# Patient Record
Sex: FEMALE | Race: WHITE | NOT HISPANIC OR LATINO | Employment: UNEMPLOYED | URBAN - NONMETROPOLITAN AREA
[De-identification: names, ages, dates, MRNs, and addresses within clinical notes are randomized per-mention and may not be internally consistent; named-entity substitution may affect disease eponyms.]

---

## 2018-05-24 ENCOUNTER — HOSPITAL ENCOUNTER (EMERGENCY)
Facility: HOSPITAL | Age: 33
Discharge: HOME | End: 2018-05-24
Admitting: EMERGENCY MEDICINE

## 2018-05-24 VITALS
OXYGEN SATURATION: 98 % | RESPIRATION RATE: 18 BRPM | TEMPERATURE: 97.5 F | BODY MASS INDEX: 28.66 KG/M2 | DIASTOLIC BLOOD PRESSURE: 69 MMHG | SYSTOLIC BLOOD PRESSURE: 104 MMHG | HEART RATE: 69 BPM | HEIGHT: 60 IN | WEIGHT: 146 LBS

## 2018-05-24 DIAGNOSIS — J40 BRONCHITIS: Primary | ICD-10-CM

## 2018-05-24 DIAGNOSIS — J02.8 PHARYNGITIS DUE TO OTHER ORGANISM: ICD-10-CM

## 2018-05-24 PROCEDURE — 99282 EMERGENCY DEPT VISIT SF MDM: CPT

## 2018-05-24 RX ORDER — BENZONATATE 200 MG/1
200 CAPSULE ORAL 3 TIMES DAILY PRN
Qty: 15 CAPSULE | Refills: 0 | Status: SHIPPED | OUTPATIENT
Start: 2018-05-24 | End: 2018-10-31

## 2018-05-24 RX ORDER — CETIRIZINE HYDROCHLORIDE, PSEUDOEPHEDRINE HYDROCHLORIDE 5; 120 MG/1; MG/1
1 TABLET, FILM COATED, EXTENDED RELEASE ORAL 2 TIMES DAILY
Qty: 15 TABLET | Refills: 0 | Status: SHIPPED | OUTPATIENT
Start: 2018-05-24 | End: 2018-12-03

## 2018-05-24 RX ORDER — AZITHROMYCIN 250 MG/1
TABLET, FILM COATED ORAL
Qty: 6 TABLET | Refills: 0 | OUTPATIENT
Start: 2018-05-24 | End: 2018-12-18

## 2018-05-24 RX ORDER — FLUOXETINE HYDROCHLORIDE 20 MG/1
20 CAPSULE ORAL
COMMUNITY

## 2018-05-24 RX ORDER — OLANZAPINE 10 MG/1
5 TABLET ORAL NIGHTLY
COMMUNITY
End: 2018-12-18

## 2018-10-31 ENCOUNTER — APPOINTMENT (OUTPATIENT)
Dept: GENERAL RADIOLOGY | Facility: HOSPITAL | Age: 33
End: 2018-10-31

## 2018-10-31 ENCOUNTER — HOSPITAL ENCOUNTER (EMERGENCY)
Facility: HOSPITAL | Age: 33
Discharge: HOME | End: 2018-10-31
Admitting: EMERGENCY MEDICINE

## 2018-10-31 VITALS
SYSTOLIC BLOOD PRESSURE: 120 MMHG | OXYGEN SATURATION: 100 % | HEART RATE: 68 BPM | RESPIRATION RATE: 18 BRPM | BODY MASS INDEX: 31.61 KG/M2 | WEIGHT: 161 LBS | TEMPERATURE: 98.2 F | DIASTOLIC BLOOD PRESSURE: 65 MMHG | HEIGHT: 60 IN

## 2018-10-31 DIAGNOSIS — J06.9 ACUTE URI: Primary | ICD-10-CM

## 2018-10-31 LAB
FLUAV AG NPH QL: NEGATIVE
FLUBV AG NPH QL IA: NEGATIVE

## 2018-10-31 PROCEDURE — 87804 INFLUENZA ASSAY W/OPTIC: CPT | Performed by: PHYSICIAN ASSISTANT

## 2018-10-31 PROCEDURE — 71046 X-RAY EXAM CHEST 2 VIEWS: CPT

## 2018-10-31 PROCEDURE — 99284 EMERGENCY DEPT VISIT MOD MDM: CPT

## 2018-10-31 RX ORDER — CETIRIZINE HYDROCHLORIDE 10 MG/1
10 TABLET ORAL DAILY
Qty: 15 TABLET | Refills: 0 | Status: SHIPPED | OUTPATIENT
Start: 2018-10-31 | End: 2018-12-03

## 2018-10-31 RX ORDER — IBUPROFEN 800 MG/1
800 TABLET ORAL ONCE
Status: COMPLETED | OUTPATIENT
Start: 2018-10-31 | End: 2018-10-31

## 2018-10-31 RX ORDER — FLUTICASONE PROPIONATE 50 MCG
2 SPRAY, SUSPENSION (ML) NASAL DAILY
Qty: 1 BOTTLE | Refills: 0 | Status: SHIPPED | OUTPATIENT
Start: 2018-10-31 | End: 2018-12-03

## 2018-10-31 RX ORDER — BUSPIRONE HYDROCHLORIDE 5 MG/1
5 TABLET ORAL 3 TIMES DAILY
COMMUNITY
End: 2020-05-27

## 2018-10-31 RX ORDER — BENZONATATE 200 MG/1
200 CAPSULE ORAL 3 TIMES DAILY PRN
Qty: 15 CAPSULE | Refills: 0 | Status: SHIPPED | OUTPATIENT
Start: 2018-10-31 | End: 2018-12-03

## 2018-10-31 RX ADMIN — IBUPROFEN 800 MG: 800 TABLET ORAL at 11:02

## 2018-12-03 ENCOUNTER — OFFICE VISIT (OUTPATIENT)
Dept: GASTROENTEROLOGY | Facility: CLINIC | Age: 33
End: 2018-12-03

## 2018-12-03 ENCOUNTER — LAB (OUTPATIENT)
Dept: LAB | Facility: HOSPITAL | Age: 33
End: 2018-12-03

## 2018-12-03 VITALS
DIASTOLIC BLOOD PRESSURE: 72 MMHG | BODY MASS INDEX: 33.57 KG/M2 | SYSTOLIC BLOOD PRESSURE: 106 MMHG | WEIGHT: 171 LBS | TEMPERATURE: 97.6 F | HEART RATE: 78 BPM | HEIGHT: 60 IN | OXYGEN SATURATION: 99 %

## 2018-12-03 DIAGNOSIS — B18.2 CHRONIC HEPATITIS C WITHOUT HEPATIC COMA (HCC): Primary | ICD-10-CM

## 2018-12-03 DIAGNOSIS — Z72.0 TOBACCO USE: ICD-10-CM

## 2018-12-03 DIAGNOSIS — B18.2 CHRONIC HEPATITIS C WITHOUT HEPATIC COMA (HCC): ICD-10-CM

## 2018-12-03 PROCEDURE — 87522 HEPATITIS C REVRS TRNSCRPJ: CPT | Performed by: NURSE PRACTITIONER

## 2018-12-03 PROCEDURE — 87902 NFCT AGT GNTYP ALYS HEP C: CPT

## 2018-12-03 PROCEDURE — 99204 OFFICE O/P NEW MOD 45 MIN: CPT | Performed by: NURSE PRACTITIONER

## 2018-12-03 PROCEDURE — 36415 COLL VENOUS BLD VENIPUNCTURE: CPT

## 2018-12-03 RX ORDER — TOPIRAMATE 100 MG/1
100 TABLET, FILM COATED ORAL NIGHTLY
COMMUNITY
End: 2020-05-27

## 2018-12-03 NOTE — PROGRESS NOTES
[facility] GASTROENTEROLOGY - OFFICE NOTE    12/3/2018    Ema Stafford   1985    Primary Physician: Miles Pinzon APRN    Chief Complaint   Patient presents with   • Hepatitis C         HISTORY OF PRESENT ILLNESS    Ema Stafford is a 33 y.o. female presents  with chronic hepatitis c > 6 yrs ago. Has h/o iv drug use. Does have tattoos. No h/o blood transfusion in past.  She was seen by a GI md in [facility] around 4 yr ago and was to get further lab test. She did not follow up /never had the labs done. No h/o treatment of hep c. No h/o liver biopsy. Some nausea. No vomiting. Some fatigue.  No jaundice. No fever. No weight loss. No abdominal pain. Unsure if has ever had hep a or b vaccines.      She is interested in pursuing treatment for hep c.     Past Medical History:   Diagnosis Date   • Anxiety    • Depression    • Hepatitis C        Past Surgical History:   Procedure Laterality Date   •  SECTION      x 3   • FOREARM FRACTURE SURGERY Right    • TUBAL ABDOMINAL LIGATION         Outpatient Medications Marked as Taking for the 12/3/18 encounter (Office Visit) with Mckayla Zarco APRN   Medication Sig Dispense Refill   • azithromycin (ZITHROMAX Z-SHARI) 250 MG tablet Take 2 tablets the first day, then 1 tablet daily for 4 days. 6 tablet 0   • busPIRone (BUSPAR) 5 MG tablet Take 5 mg by mouth 3 (Three) Times a Day.     • FLUoxetine (PROZAC) 20 MG capsule Take 20 mg by mouth Daily.     • NON FORMULARY 1 tablet Every Night. Sleeping med     • OLANZapine (zyPREXA) 10 MG tablet Take 5 mg by mouth Every Night.     • topiramate (TOPAMAX) 100 MG tablet Take 100 mg by mouth 2 (Two) Times a Day.     • [DISCONTINUED] benzonatate (TESSALON) 200 MG capsule Take 1 capsule by mouth 3 (Three) Times a Day As Needed for Cough. 15 capsule 0       No Known Allergies    Social History     Socioeconomic History   • Marital status:      Spouse name: Not on file   • Number of children: Not on  "file   • Years of education: Not on file   • Highest education level: Not on file   Social Needs   • Financial resource strain: Not on file   • Food insecurity - worry: Not on file   • Food insecurity - inability: Not on file   • Transportation needs - medical: Not on file   • Transportation needs - non-medical: Not on file   Occupational History   • Not on file   Tobacco Use   • Smoking status: Current Some Day Smoker     Packs/day: 0.50     Types: Electronic Cigarette, Cigarettes   • Smokeless tobacco: Never Used   Substance and Sexual Activity   • Alcohol use: No   • Drug use: No   • Sexual activity: Defer   Other Topics Concern   • Not on file   Social History Narrative   • Not on file       Family History   Problem Relation Age of Onset   • Colon cancer Neg Hx    • Colon polyps Neg Hx        Review of Systems   Constitutional: Positive for fatigue. Negative for appetite change, chills, fever and unexpected weight change.   HENT: Negative for sore throat and trouble swallowing.    Eyes: Negative for visual disturbance.   Respiratory: Negative for cough, chest tightness, shortness of breath and wheezing.    Cardiovascular: Negative for chest pain and palpitations.   Gastrointestinal: Positive for nausea. Negative for abdominal distention, abdominal pain, anal bleeding, blood in stool, constipation, diarrhea, rectal pain and vomiting.        As mentioned in hpi   Genitourinary: Negative for difficulty urinating and hematuria.   Musculoskeletal: Negative for arthralgias and back pain.   Skin: Negative for color change and rash.   Neurological: Negative for dizziness, seizures, syncope, light-headedness and headaches.   Hematological: Negative for adenopathy.   Psychiatric/Behavioral: Negative for confusion. The patient is not nervous/anxious.         Vitals:    12/03/18 1020   BP: 106/72   Pulse: 78   Temp: 97.6 °F (36.4 °C)   SpO2: 99%   Weight: 77.6 kg (171 lb)   Height: 152.4 cm (60\")      Body mass index is " 33.4 kg/m².    Physical Exam   Constitutional: She appears well-developed and well-nourished. No distress.   HENT:   Head: Normocephalic and atraumatic.   Eyes: EOM are normal. No scleral icterus.   Neck: Neck supple. No JVD present.   Cardiovascular: Normal rate, regular rhythm and normal heart sounds.   Pulmonary/Chest: Effort normal and breath sounds normal. No stridor.   Abdominal: Soft. Bowel sounds are normal. She exhibits no distension and no mass. There is no tenderness. There is no rebound and no guarding.   Musculoskeletal: Normal range of motion. She exhibits no deformity.   Neurological: She is alert.   Skin: Skin is warm and dry. No rash noted.   Psychiatric: She has a normal mood and affect. Her behavior is normal.   Vitals reviewed.      Results for orders placed or performed during the hospital encounter of 10/31/18   Influenza Antigen, Rapid - Swab, Nasopharynx   Result Value Ref Range    Influenza A Ag, EIA Negative Negative    Influenza B Ag, EIA Negative Negative           ASSESSMENT AND PLAN    Assessment/Plan     Ema was seen today for hepatitis c.    Diagnoses and all orders for this visit:    Chronic hepatitis C without hepatic coma (CMS/HCC)  -     HCV RNA By PCR, Qn Rfx Merly; Future    Tobacco use    will check hcv rna and if detected then order additional lab test and ultrasound liver ( elastography). Ov 6 weeks. Will contact her with results of hcv rna quant.       I had a long discussion about hepatitis c.  We discussed the risks assoc such as hepatoma, cirrhosis and what that meant.  Discussed risk or premature death.  Discussed the ill effects of etoh consumption with hep c and I advised importance of abstinence. Discussed tx options and estimated cure rates from 95-99%. We discussed the cost of treatment and what insurance companies may pay for and not.  The significance of existing fibrosis was discussed.  We discussed how to take and importance of compliance with meds, labs,  and follow up visits as directed.         I advised Ema of the risks of continuing to use tobacco, and I provided her with tobacco cessation educational materials in the After Visit Summary.     During this visit, I spent > 3  minutes counseling the patient regarding tobacco cessation.             Body mass index is 33.4 kg/m².    Patient's Body mass index is 33.4 kg/m². BMI is above normal parameters. Recommendations include: no follow-up required. Recommend weight loss.            WARREN Montelongo    EMR Dragon/transcription disclaimer:  Much of this encounter note is electronic transcription/translation of spoken language to printed text.  The electronic translation of spoken language may be erroneous, or at times, nonsensical words or phrases may be inadvertently transcribed.  Although I have reviewed the note for such errors, some may still exist.    Results for orders placed or performed during the hospital encounter of 10/31/18   Influenza Antigen, Rapid - Swab, Nasopharynx   Result Value Ref Range    Influenza A Ag, EIA Negative Negative    Influenza B Ag, EIA Negative Negative

## 2018-12-06 LAB
HCV GENTYP SERPL NAA+PROBE: NORMAL
HCV GENTYP SERPL NAA+PROBE: NORMAL
HCV RNA SERPL NAA+PROBE-ACNC: NORMAL IU/ML
HCV RNA SERPL NAA+PROBE-LOG IU: 6.03 LOG10 IU/ML
Lab: NORMAL
REF LAB TEST REF RANGE: NORMAL

## 2018-12-10 ENCOUNTER — TELEPHONE (OUTPATIENT)
Dept: GASTROENTEROLOGY | Facility: CLINIC | Age: 33
End: 2018-12-10

## 2018-12-10 DIAGNOSIS — B18.2 CHRONIC HEPATITIS C WITHOUT HEPATIC COMA (HCC): Primary | ICD-10-CM

## 2018-12-10 NOTE — TELEPHONE ENCOUNTER
Please let her know hepatitis c is still detected. We need to get additional lab test and ultrasound elastography at this point before next ov. I will put orders in computer. She has f/u appt 1-28-19.

## 2018-12-17 ENCOUNTER — HOSPITAL ENCOUNTER (OUTPATIENT)
Dept: ULTRASOUND IMAGING | Facility: HOSPITAL | Age: 33
Discharge: HOME | End: 2018-12-17
Admitting: NURSE PRACTITIONER

## 2018-12-17 ENCOUNTER — TELEPHONE (OUTPATIENT)
Dept: GASTROENTEROLOGY | Facility: CLINIC | Age: 33
End: 2018-12-17

## 2018-12-17 ENCOUNTER — LAB (OUTPATIENT)
Dept: LAB | Facility: HOSPITAL | Age: 33
End: 2018-12-17

## 2018-12-17 DIAGNOSIS — B18.2 CHRONIC HEPATITIS C WITHOUT HEPATIC COMA (HCC): ICD-10-CM

## 2018-12-17 DIAGNOSIS — R76.8 HEPATITIS C ANTIBODY POSITIVE IN BLOOD: ICD-10-CM

## 2018-12-17 LAB
ALBUMIN SERPL-MCNC: 3.7 G/DL (ref 3.5–5)
ALBUMIN/GLOB SERPL: 1.2 G/DL (ref 1.1–2.5)
ALP SERPL-CCNC: 122 U/L (ref 24–120)
ALT SERPL W P-5'-P-CCNC: 72 U/L (ref 0–54)
ANION GAP SERPL CALCULATED.3IONS-SCNC: 12 MMOL/L (ref 4–13)
AST SERPL-CCNC: 79 U/L (ref 7–45)
BASOPHILS # BLD MANUAL: 0.1 10*3/MM3 (ref 0–0.2)
BASOPHILS NFR BLD AUTO: 2.1 % (ref 0–2)
BILIRUB SERPL-MCNC: 0.3 MG/DL (ref 0.1–1)
BUN BLD-MCNC: 8 MG/DL (ref 5–21)
BUN/CREAT SERPL: 13.6 (ref 7–25)
CALCIUM SPEC-SCNC: 8.5 MG/DL (ref 8.4–10.4)
CHLORIDE SERPL-SCNC: 105 MMOL/L (ref 98–110)
CO2 SERPL-SCNC: 23 MMOL/L (ref 24–31)
CREAT BLD-MCNC: 0.59 MG/DL (ref 0.5–1.4)
DEPRECATED RDW RBC AUTO: 44 FL (ref 40–54)
EOSINOPHIL # BLD MANUAL: 0.05 10*3/MM3 (ref 0–0.7)
EOSINOPHIL NFR BLD MANUAL: 1 % (ref 0–4)
ERYTHROCYTE [DISTWIDTH] IN BLOOD BY AUTOMATED COUNT: 13.5 % (ref 12–15)
ETHANOL UR QL: <0.01 %
GFR SERPL CREATININE-BSD FRML MDRD: 116 ML/MIN/1.73
GLOBULIN UR ELPH-MCNC: 3.2 GM/DL
GLUCOSE BLD-MCNC: 111 MG/DL (ref 70–100)
HBV CORE IGM SERPL QL IA: NEGATIVE
HBV SURFACE AB SER QL: <5
HBV SURFACE AB SER RIA-ACNC: ABNORMAL
HBV SURFACE AG SERPL QL IA: NEGATIVE
HCT VFR BLD AUTO: 35.5 % (ref 37–47)
HGB BLD-MCNC: 11.6 G/DL (ref 12–16)
HIV1+2 AB SER QL: NEGATIVE
INR PPP: 0.94 (ref 0.91–1.09)
LYMPHOCYTES # BLD MANUAL: 1.48 10*3/MM3 (ref 0.72–4.86)
LYMPHOCYTES NFR BLD MANUAL: 31.3 % (ref 15–45)
LYMPHOCYTES NFR BLD MANUAL: 7.3 % (ref 4–12)
MCH RBC QN AUTO: 28.6 PG (ref 28–32)
MCHC RBC AUTO-ENTMCNC: 32.7 G/DL (ref 33–36)
MCV RBC AUTO: 87.7 FL (ref 82–98)
MONOCYTES # BLD AUTO: 0.35 10*3/MM3 (ref 0.19–1.3)
NEUTROPHILS # BLD AUTO: 2.61 10*3/MM3 (ref 1.87–8.4)
NEUTROPHILS NFR BLD MANUAL: 55.2 % (ref 39–78)
PLAT MORPH BLD: NORMAL
PLATELET # BLD AUTO: 231 10*3/MM3 (ref 130–400)
PMV BLD AUTO: 9.4 FL (ref 6–12)
POTASSIUM BLD-SCNC: 3.7 MMOL/L (ref 3.5–5.3)
PROT SERPL-MCNC: 6.9 G/DL (ref 6.3–8.7)
PROTHROMBIN TIME: 12.8 SECONDS (ref 11.9–14.6)
RBC # BLD AUTO: 4.05 10*6/MM3 (ref 4.2–5.4)
RBC MORPH BLD: NORMAL
SODIUM BLD-SCNC: 140 MMOL/L (ref 135–145)
VARIANT LYMPHS NFR BLD MANUAL: 3.1 % (ref 0–5)
WBC MORPH BLD: NORMAL
WBC NRBC COR # BLD: 4.73 10*3/MM3 (ref 4.8–10.8)

## 2018-12-17 PROCEDURE — 85007 BL SMEAR W/DIFF WBC COUNT: CPT | Performed by: NURSE PRACTITIONER

## 2018-12-17 PROCEDURE — 80307 DRUG TEST PRSMV CHEM ANLYZR: CPT | Performed by: NURSE PRACTITIONER

## 2018-12-17 PROCEDURE — 36415 COLL VENOUS BLD VENIPUNCTURE: CPT

## 2018-12-17 PROCEDURE — G0480 DRUG TEST DEF 1-7 CLASSES: HCPCS

## 2018-12-17 PROCEDURE — 76705 ECHO EXAM OF ABDOMEN: CPT

## 2018-12-17 PROCEDURE — 80053 COMPREHEN METABOLIC PANEL: CPT | Performed by: NURSE PRACTITIONER

## 2018-12-17 PROCEDURE — 86705 HEP B CORE ANTIBODY IGM: CPT | Performed by: NURSE PRACTITIONER

## 2018-12-17 PROCEDURE — 86708 HEPATITIS A ANTIBODY: CPT | Performed by: NURSE PRACTITIONER

## 2018-12-17 PROCEDURE — 86709 HEPATITIS A IGM ANTIBODY: CPT | Performed by: NURSE PRACTITIONER

## 2018-12-17 PROCEDURE — 85025 COMPLETE CBC W/AUTO DIFF WBC: CPT | Performed by: NURSE PRACTITIONER

## 2018-12-17 PROCEDURE — 85610 PROTHROMBIN TIME: CPT | Performed by: NURSE PRACTITIONER

## 2018-12-17 PROCEDURE — 86706 HEP B SURFACE ANTIBODY: CPT | Performed by: NURSE PRACTITIONER

## 2018-12-17 PROCEDURE — G0432 EIA HIV-1/HIV-2 SCREEN: HCPCS | Performed by: NURSE PRACTITIONER

## 2018-12-17 PROCEDURE — 87340 HEPATITIS B SURFACE AG IA: CPT | Performed by: NURSE PRACTITIONER

## 2018-12-17 NOTE — TELEPHONE ENCOUNTER
Please let her know ultrasound liver elastography showed changes suggestive of fatty liver with little to no scarring. No cirrhosis noted. Recommend keep f/u appt 1-28-19 to discuss and f/u on lab test. Thank you . I faxed labs and ultrasound to her pcp.

## 2018-12-18 ENCOUNTER — TELEPHONE (OUTPATIENT)
Dept: GASTROENTEROLOGY | Facility: CLINIC | Age: 33
End: 2018-12-18

## 2018-12-18 DIAGNOSIS — R76.8 HEPATITIS C ANTIBODY POSITIVE IN BLOOD: Primary | ICD-10-CM

## 2018-12-18 LAB
HAV AB SER QL IA: POSITIVE
HAV IGM SERPL QL IA: NEGATIVE

## 2018-12-18 NOTE — TELEPHONE ENCOUNTER
Can you call the lab and see  if they can run a hepatitis IgM on this blood work . I will go ahead and put in the order.thanks

## 2018-12-20 ENCOUNTER — TELEPHONE (OUTPATIENT)
Dept: GASTROENTEROLOGY | Facility: CLINIC | Age: 33
End: 2018-12-20

## 2018-12-26 LAB
6MAM SERPLBLD-MCNC: NEGATIVE NG/ML
AMPHETAMINES SERPL QL SCN: NEGATIVE NG/ML
BARBITURATES SERPLBLD QL: NEGATIVE UG/ML
BENZODIAZ SERPL QL: NEGATIVE NG/ML
BZE BLD QL SCN: NEGATIVE NG/ML
CODEINE UR QL: NEGATIVE NG/ML
DHC BLD-MCNC: 2.1 NG/ML
HYDROCODONE SERPL-MCNC: 6.1 NG/ML
HYDROMORPHONE SERPL-MCNC: NEGATIVE NG/ML
METHADONE UR QL: NEGATIVE NG/ML
MORPHINE SERPLBLD-MCNC: NEGATIVE NG/ML
OPIATES SERPL QL SCN: ABNORMAL NG/ML
OPIATES SPEC QL: POSITIVE
OXYCODONE SERPL-MCNC: NEGATIVE NG/ML
OXYCODONE SERPLBLD CFM-MCNC: NEGATIVE NG/ML
OXYCODONE+OXYMORPHONE SERPLBLD CFM-IMP: NEGATIVE
OXYMORPHONE SERPLBLD CFM-MCNC: NEGATIVE NG/ML
PCP SPEC-MCNC: NEGATIVE NG/ML
PROPOXYPH SPEC QL: NEGATIVE NG/ML
THC SERPLBLD CFM-MCNC: NEGATIVE NG/ML

## 2019-01-28 PROBLEM — B19.20 HEPATITIS C: Status: ACTIVE | Noted: 2019-01-28

## 2019-05-06 ENCOUNTER — APPOINTMENT (OUTPATIENT)
Dept: LAB | Facility: HOSPITAL | Age: 34
End: 2019-05-06

## 2019-05-06 ENCOUNTER — TRANSCRIBE ORDERS (OUTPATIENT)
Dept: ADMINISTRATIVE | Facility: HOSPITAL | Age: 34
End: 2019-05-06

## 2019-05-06 DIAGNOSIS — Z79.899 ENCOUNTER FOR LONG-TERM (CURRENT) USE OF MEDICATIONS: Primary | ICD-10-CM

## 2019-05-06 LAB
ALBUMIN SERPL-MCNC: 4.3 G/DL (ref 3.5–5)
ALBUMIN/GLOB SERPL: 1.3 G/DL (ref 1.1–2.5)
ALP SERPL-CCNC: 96 U/L (ref 24–120)
ALT SERPL W P-5'-P-CCNC: 48 U/L (ref 0–54)
ANION GAP SERPL CALCULATED.3IONS-SCNC: 9 MMOL/L (ref 4–13)
AST SERPL-CCNC: 49 U/L (ref 7–45)
BILIRUB SERPL-MCNC: 0.3 MG/DL (ref 0.1–1)
BUN BLD-MCNC: 8 MG/DL (ref 5–21)
BUN/CREAT SERPL: 13.6 (ref 7–25)
CALCIUM SPEC-SCNC: 9.3 MG/DL (ref 8.4–10.4)
CHLORIDE SERPL-SCNC: 108 MMOL/L (ref 98–110)
CO2 SERPL-SCNC: 22 MMOL/L (ref 24–31)
CREAT BLD-MCNC: 0.59 MG/DL (ref 0.5–1.4)
DEPRECATED RDW RBC AUTO: 41.1 FL (ref 40–54)
ERYTHROCYTE [DISTWIDTH] IN BLOOD BY AUTOMATED COUNT: 13.2 % (ref 12–15)
GFR SERPL CREATININE-BSD FRML MDRD: 116 ML/MIN/1.73
GLOBULIN UR ELPH-MCNC: 3.4 GM/DL
GLUCOSE BLD-MCNC: 96 MG/DL (ref 70–100)
HCT VFR BLD AUTO: 39.5 % (ref 37–47)
HGB BLD-MCNC: 13 G/DL (ref 12–16)
MCH RBC QN AUTO: 28.4 PG (ref 28–32)
MCHC RBC AUTO-ENTMCNC: 32.9 G/DL (ref 33–36)
MCV RBC AUTO: 86.4 FL (ref 82–98)
PLATELET # BLD AUTO: 245 10*3/MM3 (ref 130–400)
PMV BLD AUTO: 9.9 FL (ref 6–12)
POTASSIUM BLD-SCNC: 4 MMOL/L (ref 3.5–5.3)
PROT SERPL-MCNC: 7.7 G/DL (ref 6.3–8.7)
RBC # BLD AUTO: 4.57 10*6/MM3 (ref 4.2–5.4)
SODIUM BLD-SCNC: 139 MMOL/L (ref 135–145)
VALPROATE SERPL-MCNC: <10 MCG/ML (ref 50–100)
WBC NRBC COR # BLD: 8.26 10*3/MM3 (ref 4.8–10.8)

## 2019-05-06 PROCEDURE — 85027 COMPLETE CBC AUTOMATED: CPT | Performed by: PSYCHIATRY & NEUROLOGY

## 2019-05-06 PROCEDURE — 36415 COLL VENOUS BLD VENIPUNCTURE: CPT | Performed by: PSYCHIATRY & NEUROLOGY

## 2019-05-06 PROCEDURE — 80053 COMPREHEN METABOLIC PANEL: CPT | Performed by: PSYCHIATRY & NEUROLOGY

## 2019-05-06 PROCEDURE — 80164 ASSAY DIPROPYLACETIC ACD TOT: CPT | Performed by: PSYCHIATRY & NEUROLOGY

## 2019-06-06 ENCOUNTER — APPOINTMENT (OUTPATIENT)
Dept: CT IMAGING | Facility: HOSPITAL | Age: 34
End: 2019-06-06

## 2019-06-06 ENCOUNTER — HOSPITAL ENCOUNTER (EMERGENCY)
Facility: HOSPITAL | Age: 34
Discharge: HOME | End: 2019-06-06
Admitting: EMERGENCY MEDICINE

## 2019-06-06 VITALS
OXYGEN SATURATION: 99 % | RESPIRATION RATE: 18 BRPM | BODY MASS INDEX: 28.74 KG/M2 | HEART RATE: 64 BPM | DIASTOLIC BLOOD PRESSURE: 73 MMHG | HEIGHT: 63 IN | SYSTOLIC BLOOD PRESSURE: 105 MMHG | WEIGHT: 162.2 LBS | TEMPERATURE: 98.2 F

## 2019-06-06 DIAGNOSIS — R10.9 ACUTE LEFT FLANK PAIN: Primary | ICD-10-CM

## 2019-06-06 LAB
ALBUMIN SERPL-MCNC: 3.9 G/DL (ref 3.5–5)
ALBUMIN/GLOB SERPL: 1.1 G/DL (ref 1.1–2.5)
ALP SERPL-CCNC: 92 U/L (ref 24–120)
ALT SERPL W P-5'-P-CCNC: 40 U/L (ref 0–54)
ANION GAP SERPL CALCULATED.3IONS-SCNC: 7 MMOL/L (ref 4–13)
AST SERPL-CCNC: 39 U/L (ref 7–45)
B-HCG UR QL: NEGATIVE
BACTERIA UR QL AUTO: ABNORMAL /HPF
BASOPHILS # BLD AUTO: 0.04 10*3/MM3 (ref 0–0.2)
BASOPHILS NFR BLD AUTO: 0.5 % (ref 0–2)
BILIRUB SERPL-MCNC: 0.4 MG/DL (ref 0.1–1)
BILIRUB UR QL STRIP: NEGATIVE
BUN BLD-MCNC: 13 MG/DL (ref 5–21)
BUN/CREAT SERPL: 20.6 (ref 7–25)
CALCIUM SPEC-SCNC: 8.6 MG/DL (ref 8.4–10.4)
CHLORIDE SERPL-SCNC: 111 MMOL/L (ref 98–110)
CLARITY UR: ABNORMAL
CO2 SERPL-SCNC: 20 MMOL/L (ref 24–31)
COD CRY URNS QL: ABNORMAL /HPF
COLOR UR: YELLOW
CREAT BLD-MCNC: 0.63 MG/DL (ref 0.5–1.4)
DEPRECATED RDW RBC AUTO: 42.7 FL (ref 40–54)
EOSINOPHIL # BLD AUTO: 0.15 10*3/MM3 (ref 0–0.7)
EOSINOPHIL NFR BLD AUTO: 1.8 % (ref 0–4)
ERYTHROCYTE [DISTWIDTH] IN BLOOD BY AUTOMATED COUNT: 13.4 % (ref 12–15)
GFR SERPL CREATININE-BSD FRML MDRD: 108 ML/MIN/1.73
GLOBULIN UR ELPH-MCNC: 3.4 GM/DL
GLUCOSE BLD-MCNC: 78 MG/DL (ref 70–100)
GLUCOSE UR STRIP-MCNC: NEGATIVE MG/DL
HCT VFR BLD AUTO: 35.9 % (ref 37–47)
HGB BLD-MCNC: 12.3 G/DL (ref 12–16)
HGB UR QL STRIP.AUTO: NEGATIVE
IMM GRANULOCYTES # BLD AUTO: 0.02 10*3/MM3 (ref 0–0.05)
IMM GRANULOCYTES NFR BLD AUTO: 0.2 % (ref 0–5)
INTERNAL NEGATIVE CONTROL: NEGATIVE
INTERNAL POSITIVE CONTROL: POSITIVE
KETONES UR QL STRIP: NEGATIVE
LEUKOCYTE ESTERASE UR QL STRIP.AUTO: ABNORMAL
LIPASE SERPL-CCNC: 130 U/L (ref 23–203)
LYMPHOCYTES # BLD AUTO: 2.26 10*3/MM3 (ref 0.72–4.86)
LYMPHOCYTES NFR BLD AUTO: 26.6 % (ref 15–45)
Lab: NORMAL
MCH RBC QN AUTO: 29.7 PG (ref 28–32)
MCHC RBC AUTO-ENTMCNC: 34.3 G/DL (ref 33–36)
MCV RBC AUTO: 86.7 FL (ref 82–98)
MONOCYTES # BLD AUTO: 0.6 10*3/MM3 (ref 0.19–1.3)
MONOCYTES NFR BLD AUTO: 7.1 % (ref 4–12)
NEUTROPHILS # BLD AUTO: 5.43 10*3/MM3 (ref 1.87–8.4)
NEUTROPHILS NFR BLD AUTO: 63.8 % (ref 39–78)
NITRITE UR QL STRIP: NEGATIVE
NRBC BLD AUTO-RTO: 0 /100 WBC (ref 0–0.2)
PH UR STRIP.AUTO: <=5 [PH] (ref 5–8)
PLATELET # BLD AUTO: 244 10*3/MM3 (ref 130–400)
PMV BLD AUTO: 9.9 FL (ref 6–12)
POTASSIUM BLD-SCNC: 4.1 MMOL/L (ref 3.5–5.3)
PROT SERPL-MCNC: 7.3 G/DL (ref 6.3–8.7)
PROT UR QL STRIP: NEGATIVE
RBC # BLD AUTO: 4.14 10*6/MM3 (ref 4.2–5.4)
RBC # UR: ABNORMAL /HPF
REF LAB TEST METHOD: ABNORMAL
SODIUM BLD-SCNC: 138 MMOL/L (ref 135–145)
SP GR UR STRIP: 1.02 (ref 1–1.03)
SQUAMOUS #/AREA URNS HPF: ABNORMAL /HPF
UROBILINOGEN UR QL STRIP: ABNORMAL
WBC NRBC COR # BLD: 8.5 10*3/MM3 (ref 4.8–10.8)
WBC UR QL AUTO: ABNORMAL /HPF
YEAST URNS QL MICRO: ABNORMAL /HPF

## 2019-06-06 PROCEDURE — 81025 URINE PREGNANCY TEST: CPT | Performed by: NURSE PRACTITIONER

## 2019-06-06 PROCEDURE — 80053 COMPREHEN METABOLIC PANEL: CPT | Performed by: NURSE PRACTITIONER

## 2019-06-06 PROCEDURE — 74176 CT ABD & PELVIS W/O CONTRAST: CPT

## 2019-06-06 PROCEDURE — 81001 URINALYSIS AUTO W/SCOPE: CPT | Performed by: NURSE PRACTITIONER

## 2019-06-06 PROCEDURE — 83690 ASSAY OF LIPASE: CPT | Performed by: NURSE PRACTITIONER

## 2019-06-06 PROCEDURE — 25010000002 KETOROLAC TROMETHAMINE PER 15 MG: Performed by: NURSE PRACTITIONER

## 2019-06-06 PROCEDURE — 85025 COMPLETE CBC W/AUTO DIFF WBC: CPT | Performed by: NURSE PRACTITIONER

## 2019-06-06 PROCEDURE — 96374 THER/PROPH/DIAG INJ IV PUSH: CPT

## 2019-06-06 PROCEDURE — 96375 TX/PRO/DX INJ NEW DRUG ADDON: CPT

## 2019-06-06 PROCEDURE — 99284 EMERGENCY DEPT VISIT MOD MDM: CPT

## 2019-06-06 PROCEDURE — 25010000002 ONDANSETRON PER 1 MG: Performed by: NURSE PRACTITIONER

## 2019-06-06 RX ORDER — KETOROLAC TROMETHAMINE 30 MG/ML
30 INJECTION, SOLUTION INTRAMUSCULAR; INTRAVENOUS ONCE
Status: COMPLETED | OUTPATIENT
Start: 2019-06-06 | End: 2019-06-06

## 2019-06-06 RX ORDER — ONDANSETRON 4 MG/1
4 TABLET, ORALLY DISINTEGRATING ORAL EVERY 6 HOURS PRN
Qty: 10 TABLET | Refills: 0 | Status: SHIPPED | OUTPATIENT
Start: 2019-06-06 | End: 2020-02-24

## 2019-06-06 RX ORDER — ONDANSETRON 2 MG/ML
4 INJECTION INTRAMUSCULAR; INTRAVENOUS ONCE
Status: COMPLETED | OUTPATIENT
Start: 2019-06-06 | End: 2019-06-06

## 2019-06-06 RX ORDER — KETOROLAC TROMETHAMINE 10 MG/1
10 TABLET, FILM COATED ORAL EVERY 6 HOURS PRN
Qty: 15 TABLET | Refills: 0 | Status: SHIPPED | OUTPATIENT
Start: 2019-06-06 | End: 2020-02-24

## 2019-06-06 RX ADMIN — KETOROLAC TROMETHAMINE 30 MG: 30 INJECTION, SOLUTION INTRAMUSCULAR at 17:51

## 2019-06-06 RX ADMIN — ONDANSETRON 4 MG: 2 INJECTION INTRAMUSCULAR; INTRAVENOUS at 17:51

## 2019-06-06 RX ADMIN — SODIUM CHLORIDE 500 ML: 9 INJECTION, SOLUTION INTRAVENOUS at 17:51

## 2019-06-06 NOTE — ED PROVIDER NOTES
Subjective     Abdominal Pain   Pain location:  L flank  Pain quality: sharp    Pain severity:  Moderate  Duration:  2 weeks  Timing:  Intermittent  Chronicity:  New  Relieved by:  None tried  Worsened by:  Nothing  Ineffective treatments:  None tried  Associated symptoms: constipation, dysuria, nausea and vomiting    Associated symptoms: no chills, no fatigue, no fever, no shortness of breath and no sore throat        Review of Systems   Constitutional: Negative for appetite change, chills, fatigue and fever.   HENT: Negative for congestion and sore throat.    Respiratory: Negative for chest tightness and shortness of breath.    Cardiovascular: Negative for palpitations.   Gastrointestinal: Positive for abdominal pain, constipation, nausea and vomiting. Negative for abdominal distention.   Genitourinary: Positive for dysuria. Negative for difficulty urinating.   Musculoskeletal: Negative for back pain, joint swelling, neck pain and neck stiffness.   Skin: Negative for rash.   All other systems reviewed and are negative.      Past Medical History:   Diagnosis Date   • Anxiety    • Depression    • Hepatitis C        No Known Allergies    Past Surgical History:   Procedure Laterality Date   •  SECTION      x 3   • FOREARM FRACTURE SURGERY Right    • TUBAL ABDOMINAL LIGATION         Family History   Problem Relation Age of Onset   • Colon cancer Neg Hx    • Colon polyps Neg Hx        Social History     Socioeconomic History   • Marital status:      Spouse name: Not on file   • Number of children: Not on file   • Years of education: Not on file   • Highest education level: Not on file   Tobacco Use   • Smoking status: Current Some Day Smoker     Packs/day: 0.50     Types: Electronic Cigarette, Cigarettes   • Smokeless tobacco: Never Used   Substance and Sexual Activity   • Alcohol use: No   • Drug use: No   • Sexual activity: Defer           Objective   Physical Exam   Constitutional: She is oriented to  person, place, and time. She appears well-developed and well-nourished.   HENT:   Head: Normocephalic and atraumatic.   Nose: Nose normal.   Mouth/Throat: Oropharynx is clear and moist.   Eyes: Conjunctivae and EOM are normal. Pupils are equal, round, and reactive to light.   Neck: Normal range of motion. Neck supple.   Cardiovascular: Normal rate, regular rhythm, normal heart sounds and intact distal pulses.   Pulmonary/Chest: Effort normal and breath sounds normal.   Abdominal: Soft. Bowel sounds are normal. There is tenderness in the left lower quadrant. There is CVA tenderness.   Musculoskeletal: Normal range of motion.   Neurological: She is alert and oriented to person, place, and time.   Skin: Skin is warm and dry. Capillary refill takes less than 2 seconds.   Psychiatric: She has a normal mood and affect. Her behavior is normal.   Nursing note and vitals reviewed.      Procedures           ED Course                  MDM  Number of Diagnoses or Management Options  Acute left flank pain: new and requires workup     Amount and/or Complexity of Data Reviewed  Clinical lab tests: ordered and reviewed  Tests in the radiology section of CPT®: ordered and reviewed  Discuss the patient with other providers: yes    Risk of Complications, Morbidity, and/or Mortality  Presenting problems: moderate  Diagnostic procedures: moderate  Management options: moderate    Patient Progress  Patient progress: improved        Final diagnoses:   Acute left flank pain            Nicole Martinez, WARREN  06/06/19 0217

## 2019-06-24 ENCOUNTER — HOSPITAL ENCOUNTER (OUTPATIENT)
Dept: GENERAL RADIOLOGY | Facility: HOSPITAL | Age: 34
Discharge: HOME | End: 2019-06-24

## 2019-06-24 ENCOUNTER — TRANSCRIBE ORDERS (OUTPATIENT)
Dept: ADMINISTRATIVE | Facility: HOSPITAL | Age: 34
End: 2019-06-24

## 2019-06-24 DIAGNOSIS — M79.671 RIGHT FOOT PAIN: Primary | ICD-10-CM

## 2019-06-24 DIAGNOSIS — M79.671 RIGHT FOOT PAIN: ICD-10-CM

## 2019-06-24 PROCEDURE — 73630 X-RAY EXAM OF FOOT: CPT

## 2019-11-15 ENCOUNTER — HOSPITAL ENCOUNTER (EMERGENCY)
Facility: HOSPITAL | Age: 34
Discharge: HOME | End: 2019-11-15
Admitting: EMERGENCY MEDICINE

## 2019-11-15 VITALS
BODY MASS INDEX: 32.98 KG/M2 | OXYGEN SATURATION: 98 % | TEMPERATURE: 97.4 F | DIASTOLIC BLOOD PRESSURE: 77 MMHG | WEIGHT: 168 LBS | HEART RATE: 87 BPM | HEIGHT: 60 IN | SYSTOLIC BLOOD PRESSURE: 125 MMHG | RESPIRATION RATE: 15 BRPM

## 2019-11-15 DIAGNOSIS — R31.9 URINARY TRACT INFECTION WITH HEMATURIA, SITE UNSPECIFIED: Primary | ICD-10-CM

## 2019-11-15 DIAGNOSIS — N39.0 URINARY TRACT INFECTION WITH HEMATURIA, SITE UNSPECIFIED: Primary | ICD-10-CM

## 2019-11-15 LAB
B-HCG UR QL: NEGATIVE
BACTERIA UR QL AUTO: ABNORMAL /HPF
BILIRUB UR QL STRIP: NEGATIVE
CLARITY UR: CLEAR
COLOR UR: YELLOW
GLUCOSE UR STRIP-MCNC: NEGATIVE MG/DL
HGB UR QL STRIP.AUTO: ABNORMAL
HYALINE CASTS UR QL AUTO: ABNORMAL /LPF
INTERNAL NEGATIVE CONTROL: NEGATIVE
INTERNAL POSITIVE CONTROL: POSITIVE
KETONES UR QL STRIP: NEGATIVE
LEUKOCYTE ESTERASE UR QL STRIP.AUTO: ABNORMAL
Lab: NORMAL
NITRITE UR QL STRIP: NEGATIVE
PH UR STRIP.AUTO: 6.5 [PH] (ref 5–8)
PROT UR QL STRIP: NEGATIVE
RBC # UR: ABNORMAL /HPF
REF LAB TEST METHOD: ABNORMAL
SP GR UR STRIP: 1.02 (ref 1–1.03)
SQUAMOUS #/AREA URNS HPF: ABNORMAL /HPF
UROBILINOGEN UR QL STRIP: ABNORMAL
WBC UR QL AUTO: ABNORMAL /HPF

## 2019-11-15 PROCEDURE — 99283 EMERGENCY DEPT VISIT LOW MDM: CPT

## 2019-11-15 PROCEDURE — 87086 URINE CULTURE/COLONY COUNT: CPT | Performed by: NURSE PRACTITIONER

## 2019-11-15 PROCEDURE — 81025 URINE PREGNANCY TEST: CPT | Performed by: NURSE PRACTITIONER

## 2019-11-15 PROCEDURE — 81001 URINALYSIS AUTO W/SCOPE: CPT | Performed by: NURSE PRACTITIONER

## 2019-11-15 RX ORDER — SULFAMETHOXAZOLE AND TRIMETHOPRIM 800; 160 MG/1; MG/1
1 TABLET ORAL 2 TIMES DAILY
Qty: 20 TABLET | Refills: 0 | Status: SHIPPED | OUTPATIENT
Start: 2019-11-15 | End: 2019-11-25

## 2019-11-15 RX ORDER — PHENAZOPYRIDINE HYDROCHLORIDE 200 MG/1
200 TABLET, FILM COATED ORAL 3 TIMES DAILY PRN
Qty: 6 TABLET | Refills: 0 | Status: SHIPPED | OUTPATIENT
Start: 2019-11-15 | End: 2020-02-24

## 2019-11-16 LAB — BACTERIA SPEC AEROBE CULT: NORMAL

## 2019-11-16 NOTE — ED PROVIDER NOTES
Subjective     Dysuria   Pain quality:  Burning and sharp  Pain severity:  Moderate  Chronicity:  New  Recent urinary tract infections: no    Relieved by:  None tried  Worsened by:  Nothing  Ineffective treatments:  None tried  Urinary symptoms: frequent urination and hematuria    Urinary symptoms: no hesitancy and no bladder incontinence    Associated symptoms: abdominal pain    Associated symptoms: no fever, no flank pain, no genital lesions, no nausea, no vaginal discharge and no vomiting    Risk factors: no hx of pyelonephritis, no hx of urolithiasis and no recurrent urinary tract infections        Review of Systems   Constitutional: Negative.  Negative for fever.   HENT: Negative.    Respiratory: Negative.    Cardiovascular: Negative.    Gastrointestinal: Positive for abdominal pain. Negative for nausea and vomiting.   Genitourinary: Positive for dysuria and hematuria. Negative for flank pain and vaginal discharge.   Musculoskeletal: Negative.    Skin: Negative.    All other systems reviewed and are negative.      Past Medical History:   Diagnosis Date   • Anxiety    • Depression    • Hepatitis C        No Known Allergies    Past Surgical History:   Procedure Laterality Date   •  SECTION      x 3   • FOREARM FRACTURE SURGERY Right    • TUBAL ABDOMINAL LIGATION         Family History   Problem Relation Age of Onset   • Colon cancer Neg Hx    • Colon polyps Neg Hx        Social History     Socioeconomic History   • Marital status:      Spouse name: Not on file   • Number of children: Not on file   • Years of education: Not on file   • Highest education level: Not on file   Tobacco Use   • Smoking status: Current Some Day Smoker     Packs/day: 0.50     Types: Electronic Cigarette, Cigarettes   • Smokeless tobacco: Never Used   Substance and Sexual Activity   • Alcohol use: No   • Drug use: No   • Sexual activity: Defer           Objective   Physical Exam   Constitutional: She is oriented to person,  place, and time. She appears well-developed and well-nourished.   HENT:   Head: Normocephalic and atraumatic.   Right Ear: External ear normal.   Left Ear: External ear normal.   Nose: Nose normal.   Eyes: Conjunctivae and EOM are normal. Pupils are equal, round, and reactive to light.   Neck: Normal range of motion. Neck supple.   Cardiovascular: Normal rate, regular rhythm, normal heart sounds and intact distal pulses.   Pulmonary/Chest: Effort normal and breath sounds normal.   Abdominal: Soft. Bowel sounds are normal.   Musculoskeletal: Normal range of motion.   Neurological: She is alert and oriented to person, place, and time.   Skin: Skin is warm and dry. Capillary refill takes less than 2 seconds.   Psychiatric: She has a normal mood and affect. Her behavior is normal. Judgment and thought content normal.   Nursing note and vitals reviewed.      Procedures           ED Course                  MDM  Number of Diagnoses or Management Options  Urinary tract infection with hematuria, site unspecified: new and requires workup     Amount and/or Complexity of Data Reviewed  Clinical lab tests: ordered and reviewed  Discuss the patient with other providers: yes    Risk of Complications, Morbidity, and/or Mortality  Presenting problems: low  Diagnostic procedures: low  Management options: low    Patient Progress  Patient progress: improved      Final diagnoses:   Urinary tract infection with hematuria, site unspecified              Nicole Martinez, APRN  11/15/19 2376

## 2019-12-19 ENCOUNTER — HOSPITAL ENCOUNTER (EMERGENCY)
Facility: HOSPITAL | Age: 34
Discharge: HOME | End: 2019-12-19
Admitting: EMERGENCY MEDICINE

## 2019-12-19 ENCOUNTER — APPOINTMENT (OUTPATIENT)
Dept: GENERAL RADIOLOGY | Facility: HOSPITAL | Age: 34
End: 2019-12-19

## 2019-12-19 VITALS
OXYGEN SATURATION: 100 % | HEART RATE: 94 BPM | DIASTOLIC BLOOD PRESSURE: 83 MMHG | RESPIRATION RATE: 18 BRPM | BODY MASS INDEX: 30.73 KG/M2 | WEIGHT: 180 LBS | HEIGHT: 64 IN | SYSTOLIC BLOOD PRESSURE: 142 MMHG | TEMPERATURE: 98 F

## 2019-12-19 DIAGNOSIS — M54.9 ACUTE BACK PAIN, UNSPECIFIED BACK LOCATION, UNSPECIFIED BACK PAIN LATERALITY: ICD-10-CM

## 2019-12-19 DIAGNOSIS — J06.9 UPPER RESPIRATORY TRACT INFECTION, UNSPECIFIED TYPE: Primary | ICD-10-CM

## 2019-12-19 DIAGNOSIS — H66.90 ACUTE OTITIS MEDIA, UNSPECIFIED OTITIS MEDIA TYPE: ICD-10-CM

## 2019-12-19 LAB
FLUAV AG NPH QL: NEGATIVE
FLUBV AG NPH QL IA: NEGATIVE
S PYO AG THROAT QL: NEGATIVE

## 2019-12-19 PROCEDURE — 87880 STREP A ASSAY W/OPTIC: CPT | Performed by: NURSE PRACTITIONER

## 2019-12-19 PROCEDURE — 87804 INFLUENZA ASSAY W/OPTIC: CPT | Performed by: NURSE PRACTITIONER

## 2019-12-19 PROCEDURE — 99283 EMERGENCY DEPT VISIT LOW MDM: CPT

## 2019-12-19 PROCEDURE — 71046 X-RAY EXAM CHEST 2 VIEWS: CPT

## 2019-12-19 PROCEDURE — 87081 CULTURE SCREEN ONLY: CPT | Performed by: NURSE PRACTITIONER

## 2019-12-19 RX ORDER — AMOXICILLIN AND CLAVULANATE POTASSIUM 875; 125 MG/1; MG/1
1 TABLET, FILM COATED ORAL 2 TIMES DAILY
Qty: 14 TABLET | Refills: 0 | Status: SHIPPED | OUTPATIENT
Start: 2019-12-19 | End: 2019-12-26

## 2019-12-19 RX ORDER — BENZONATATE 200 MG/1
200 CAPSULE ORAL 3 TIMES DAILY PRN
Qty: 9 CAPSULE | Refills: 0 | Status: SHIPPED | OUTPATIENT
Start: 2019-12-19 | End: 2019-12-22

## 2019-12-19 RX ORDER — CYCLOBENZAPRINE HCL 5 MG
5 TABLET ORAL 3 TIMES DAILY PRN
Qty: 6 TABLET | Refills: 0 | Status: SHIPPED | OUTPATIENT
Start: 2019-12-19 | End: 2019-12-21

## 2019-12-19 RX ORDER — METHYLPREDNISOLONE 4 MG/1
TABLET ORAL
Qty: 1 EACH | Refills: 0 | Status: SHIPPED | OUTPATIENT
Start: 2019-12-19 | End: 2020-02-24

## 2019-12-19 NOTE — ED PROVIDER NOTES
Subjective     History provided by:  Patient   used: No    Flu Symptoms   Presenting symptoms: cough, fatigue, myalgias and sore throat    Presenting symptoms: no diarrhea, no fever, no headaches, no nausea, no rhinorrhea, no shortness of breath and no vomiting    Presenting symptoms comment:  Pt reports s/s began two days ago; reports normally gets bronchitis this time of year  Severity:  Moderate  Onset quality:  Sudden  Duration:  2 days  Progression:  Worsening  Chronicity:  New  Relieved by:  Nothing  Worsened by:  Nothing  Ineffective treatments:  None tried  Associated symptoms: no chills, no decreased appetite, no decrease in physical activity, no ear pain, no mental status change, no congestion, no neck stiffness and no syncope    Risk factors: sick contacts    Risk factors: not elderly, no diabetes problem, no heart disease, no immunocompromised state, no kidney disease, no liver disease and not pregnant        Review of Systems   Constitutional: Positive for fatigue. Negative for chills, decreased appetite and fever.   HENT: Positive for sore throat. Negative for congestion, ear pain and rhinorrhea.    Respiratory: Positive for cough. Negative for shortness of breath.    Gastrointestinal: Negative for diarrhea, nausea and vomiting.   Musculoskeletal: Positive for myalgias. Negative for neck stiffness.   Neurological: Negative for headaches.   All other systems reviewed and are negative.      Past Medical History:   Diagnosis Date   • Anxiety    • Depression    • Hepatitis C        No Known Allergies    Past Surgical History:   Procedure Laterality Date   •  SECTION      x 3   • FOREARM FRACTURE SURGERY Right    • TUBAL ABDOMINAL LIGATION         Family History   Problem Relation Age of Onset   • Colon cancer Neg Hx    • Colon polyps Neg Hx        Social History     Socioeconomic History   • Marital status:      Spouse name: Not on file   • Number of children: Not on file    • Years of education: Not on file   • Highest education level: Not on file   Tobacco Use   • Smoking status: Current Some Day Smoker     Packs/day: 0.50     Types: Electronic Cigarette, Cigarettes   • Smokeless tobacco: Never Used   Substance and Sexual Activity   • Alcohol use: No   • Drug use: No   • Sexual activity: Defer           Objective   Physical Exam   Constitutional: She is oriented to person, place, and time. She appears well-developed and well-nourished.   HENT:   Head: Normocephalic and atraumatic.   Right Ear: Hearing and external ear normal. There is tenderness. Tympanic membrane is erythematous. A middle ear effusion is present.   Left Ear: Hearing, tympanic membrane, external ear and ear canal normal.   Mouth/Throat: Uvula is midline, oropharynx is clear and moist and mucous membranes are normal. Tonsils are 0 on the right. Tonsils are 0 on the left. No tonsillar exudate.   Eyes: Pupils are equal, round, and reactive to light. Conjunctivae are normal.   Cardiovascular: Normal rate, regular rhythm and normal heart sounds.   Pulmonary/Chest: Effort normal and breath sounds normal.   Neurological: She is alert and oriented to person, place, and time.   Skin: Skin is warm and dry. Capillary refill takes less than 2 seconds.   Psychiatric: She has a normal mood and affect.   Nursing note and vitals reviewed.      Procedures           ED Course  ED Course as of Dec 19 1418   Thu Dec 19, 2019   1417 The patient's chest x-ray and labs were unremarkable.  At this time the patient be discharged home in stable condition.  She will given a prescription for Augmentin, Tessalon Perles, and a Medrol Dosepak.  Patient is also reporting a body aches and cramping in her back.  We will give her prescription for Flexeril.  She is requesting this.  At this time she will discharged home in stable condition was return the ER if any new or worsening symptoms.    [LF]      ED Course User Index  [LF] June Stephens,  APRN           XR Chest 2 View   Final Result       No acute findings.   This report was finalized on 12/19/2019 13:19 by Dr. Andre Malik MD.        Labs Reviewed   RAPID STREP A SCREEN - Normal   INFLUENZA ANTIGEN, RAPID - Normal    Narrative:     Recommend confirmation of negative results by viral culture or molecular assay.   BETA HEMOLYTIC STREP CULTURE, THROAT                No data recorded                        MDM  Number of Diagnoses or Management Options  Acute back pain, unspecified back location, unspecified back pain laterality: new and requires workup  Acute otitis media, unspecified otitis media type: new and requires workup  Upper respiratory tract infection, unspecified type: new and requires workup     Amount and/or Complexity of Data Reviewed  Clinical lab tests: ordered and reviewed  Tests in the radiology section of CPT®: ordered and reviewed    Patient Progress  Patient progress: stable      Final diagnoses:   Upper respiratory tract infection, unspecified type   Acute otitis media, unspecified otitis media type   Acute back pain, unspecified back location, unspecified back pain laterality              June Stephens, APRN  12/19/19 141

## 2019-12-21 LAB — BACTERIA SPEC AEROBE CULT: NORMAL

## 2020-02-24 ENCOUNTER — HOSPITAL ENCOUNTER (OUTPATIENT)
Dept: GENERAL RADIOLOGY | Facility: HOSPITAL | Age: 35
Discharge: HOME | End: 2020-02-24
Admitting: NURSE PRACTITIONER

## 2020-02-24 PROCEDURE — 71046 X-RAY EXAM CHEST 2 VIEWS: CPT

## 2020-03-04 ENCOUNTER — APPOINTMENT (OUTPATIENT)
Dept: LAB | Facility: HOSPITAL | Age: 35
End: 2020-03-04

## 2020-03-04 ENCOUNTER — TRANSCRIBE ORDERS (OUTPATIENT)
Dept: ADMINISTRATIVE | Facility: HOSPITAL | Age: 35
End: 2020-03-04

## 2020-03-04 DIAGNOSIS — F41.9 ANXIETY HYPERVENTILATION: ICD-10-CM

## 2020-03-04 DIAGNOSIS — F11.20 OPIOID TYPE DEPENDENCE, CONTINUOUS (HCC): ICD-10-CM

## 2020-03-04 DIAGNOSIS — R53.83 OTHER FATIGUE: Primary | ICD-10-CM

## 2020-03-04 DIAGNOSIS — F45.8 ANXIETY HYPERVENTILATION: ICD-10-CM

## 2020-03-04 LAB
ALBUMIN SERPL-MCNC: 4 G/DL (ref 3.5–5.2)
ALBUMIN/GLOB SERPL: 1.1 G/DL
ALP SERPL-CCNC: 224 U/L (ref 39–117)
ALT SERPL W P-5'-P-CCNC: 38 U/L (ref 1–33)
ANION GAP SERPL CALCULATED.3IONS-SCNC: 13 MMOL/L (ref 5–15)
AST SERPL-CCNC: 47 U/L (ref 1–32)
BILIRUB SERPL-MCNC: 0.3 MG/DL (ref 0.2–1.2)
BUN BLD-MCNC: 6 MG/DL (ref 6–20)
BUN/CREAT SERPL: 7.7 (ref 7–25)
CALCIUM SPEC-SCNC: 9.5 MG/DL (ref 8.6–10.5)
CHLORIDE SERPL-SCNC: 108 MMOL/L (ref 98–107)
CO2 SERPL-SCNC: 24 MMOL/L (ref 22–29)
CREAT BLD-MCNC: 0.78 MG/DL (ref 0.57–1)
DEPRECATED RDW RBC AUTO: 42 FL (ref 37–54)
ERYTHROCYTE [DISTWIDTH] IN BLOOD BY AUTOMATED COUNT: 13 % (ref 12.3–15.4)
GFR SERPL CREATININE-BSD FRML MDRD: 84 ML/MIN/1.73
GLOBULIN UR ELPH-MCNC: 3.5 GM/DL
GLUCOSE BLD-MCNC: 124 MG/DL (ref 65–99)
HAV IGM SERPL QL IA: ABNORMAL
HBV CORE IGM SERPL QL IA: REACTIVE
HBV SURFACE AG SERPL QL IA: ABNORMAL
HCT VFR BLD AUTO: 39.9 % (ref 34–46.6)
HCV AB SER DONR QL: REACTIVE
HGB BLD-MCNC: 12.4 G/DL (ref 12–15.9)
HOLD SPECIMEN: NORMAL
MCH RBC QN AUTO: 27.4 PG (ref 26.6–33)
MCHC RBC AUTO-ENTMCNC: 31.1 G/DL (ref 31.5–35.7)
MCV RBC AUTO: 88.1 FL (ref 79–97)
PLATELET # BLD AUTO: 301 10*3/MM3 (ref 140–450)
PMV BLD AUTO: 9.7 FL (ref 6–12)
POTASSIUM BLD-SCNC: 3.5 MMOL/L (ref 3.5–5.2)
PROT SERPL-MCNC: 7.5 G/DL (ref 6–8.5)
RBC # BLD AUTO: 4.53 10*6/MM3 (ref 3.77–5.28)
SODIUM BLD-SCNC: 145 MMOL/L (ref 136–145)
T4 FREE SERPL-MCNC: 1.53 NG/DL (ref 0.93–1.7)
TSH SERPL DL<=0.05 MIU/L-ACNC: 0.96 UIU/ML (ref 0.27–4.2)
WBC NRBC COR # BLD: 8.36 10*3/MM3 (ref 3.4–10.8)

## 2020-03-04 PROCEDURE — 80074 ACUTE HEPATITIS PANEL: CPT | Performed by: NURSE PRACTITIONER

## 2020-03-04 PROCEDURE — 80053 COMPREHEN METABOLIC PANEL: CPT | Performed by: NURSE PRACTITIONER

## 2020-03-04 PROCEDURE — 85027 COMPLETE CBC AUTOMATED: CPT | Performed by: NURSE PRACTITIONER

## 2020-03-04 PROCEDURE — 36415 COLL VENOUS BLD VENIPUNCTURE: CPT | Performed by: NURSE PRACTITIONER

## 2020-03-04 PROCEDURE — 84443 ASSAY THYROID STIM HORMONE: CPT | Performed by: NURSE PRACTITIONER

## 2020-03-04 PROCEDURE — 84439 ASSAY OF FREE THYROXINE: CPT | Performed by: NURSE PRACTITIONER

## 2020-10-29 ENCOUNTER — HOSPITAL ENCOUNTER (OUTPATIENT)
Dept: GENERAL RADIOLOGY | Facility: HOSPITAL | Age: 35
Discharge: HOME | End: 2020-10-29
Admitting: NURSE PRACTITIONER

## 2020-10-29 ENCOUNTER — TRANSCRIBE ORDERS (OUTPATIENT)
Dept: GENERAL RADIOLOGY | Facility: HOSPITAL | Age: 35
End: 2020-10-29

## 2020-10-29 DIAGNOSIS — M25.562 ACUTE BILATERAL KNEE PAIN: ICD-10-CM

## 2020-10-29 DIAGNOSIS — M25.562 ACUTE BILATERAL KNEE PAIN: Primary | ICD-10-CM

## 2020-10-29 DIAGNOSIS — M25.561 ACUTE BILATERAL KNEE PAIN: ICD-10-CM

## 2020-10-29 DIAGNOSIS — M25.561 ACUTE BILATERAL KNEE PAIN: Primary | ICD-10-CM

## 2020-10-29 PROCEDURE — 73562 X-RAY EXAM OF KNEE 3: CPT

## 2020-11-30 ENCOUNTER — TRANSCRIBE ORDERS (OUTPATIENT)
Dept: ADMINISTRATIVE | Facility: HOSPITAL | Age: 35
End: 2020-11-30

## 2020-11-30 DIAGNOSIS — M25.562 LEFT KNEE PAIN, UNSPECIFIED CHRONICITY: ICD-10-CM

## 2020-11-30 DIAGNOSIS — M25.561 RIGHT KNEE PAIN, UNSPECIFIED CHRONICITY: Primary | ICD-10-CM

## 2020-12-22 ENCOUNTER — APPOINTMENT (OUTPATIENT)
Dept: MRI IMAGING | Facility: HOSPITAL | Age: 35
End: 2020-12-22

## 2020-12-22 ENCOUNTER — HOSPITAL ENCOUNTER (OUTPATIENT)
Dept: MRI IMAGING | Facility: HOSPITAL | Age: 35
End: 2020-12-22

## 2021-03-08 ENCOUNTER — LAB (OUTPATIENT)
Dept: LAB | Facility: HOSPITAL | Age: 36
End: 2021-03-08

## 2021-03-08 ENCOUNTER — TRANSCRIBE ORDERS (OUTPATIENT)
Dept: LAB | Facility: HOSPITAL | Age: 36
End: 2021-03-08

## 2021-03-08 DIAGNOSIS — Z01.818 PRE-OP EXAM: Primary | ICD-10-CM

## 2021-03-08 DIAGNOSIS — Z01.818 PRE-OP EXAM: ICD-10-CM

## 2021-03-08 PROCEDURE — U0004 COV-19 TEST NON-CDC HGH THRU: HCPCS

## 2021-03-08 PROCEDURE — C9803 HOPD COVID-19 SPEC COLLECT: HCPCS

## 2021-03-09 LAB — SARS-COV-2 RNA RESP QL NAA+PROBE: NOT DETECTED

## 2021-06-11 ENCOUNTER — HOSPITAL ENCOUNTER (EMERGENCY)
Facility: HOSPITAL | Age: 36
Discharge: HOME | End: 2021-06-11
Attending: EMERGENCY MEDICINE | Admitting: EMERGENCY MEDICINE

## 2021-06-11 VITALS
BODY MASS INDEX: 28.66 KG/M2 | OXYGEN SATURATION: 98 % | HEIGHT: 60 IN | HEART RATE: 95 BPM | DIASTOLIC BLOOD PRESSURE: 71 MMHG | RESPIRATION RATE: 16 BRPM | WEIGHT: 146 LBS | SYSTOLIC BLOOD PRESSURE: 115 MMHG | TEMPERATURE: 98.2 F

## 2021-06-11 DIAGNOSIS — L03.114 CELLULITIS OF LEFT UPPER EXTREMITY: Primary | ICD-10-CM

## 2021-06-11 PROCEDURE — 99282 EMERGENCY DEPT VISIT SF MDM: CPT

## 2021-06-11 PROCEDURE — 90715 TDAP VACCINE 7 YRS/> IM: CPT | Performed by: PHYSICIAN ASSISTANT

## 2021-06-11 PROCEDURE — 96372 THER/PROPH/DIAG INJ SC/IM: CPT

## 2021-06-11 PROCEDURE — 90471 IMMUNIZATION ADMIN: CPT | Performed by: PHYSICIAN ASSISTANT

## 2021-06-11 PROCEDURE — 25010000002 TDAP 5-2.5-18.5 LF-MCG/0.5 SUSPENSION: Performed by: PHYSICIAN ASSISTANT

## 2021-06-11 PROCEDURE — 25010000002 KETOROLAC TROMETHAMINE PER 15 MG: Performed by: PHYSICIAN ASSISTANT

## 2021-06-11 RX ORDER — KETOROLAC TROMETHAMINE 30 MG/ML
30 INJECTION, SOLUTION INTRAMUSCULAR; INTRAVENOUS ONCE
Status: COMPLETED | OUTPATIENT
Start: 2021-06-11 | End: 2021-06-11

## 2021-06-11 RX ORDER — CLINDAMYCIN HYDROCHLORIDE 300 MG/1
300 CAPSULE ORAL 3 TIMES DAILY
Qty: 21 CAPSULE | Refills: 0 | Status: SHIPPED | OUTPATIENT
Start: 2021-06-11 | End: 2021-06-18

## 2021-06-11 RX ADMIN — KETOROLAC TROMETHAMINE 30 MG: 30 INJECTION, SOLUTION INTRAMUSCULAR at 14:38

## 2021-06-11 RX ADMIN — TETANUS TOXOID, REDUCED DIPHTHERIA TOXOID AND ACELLULAR PERTUSSIS VACCINE, ADSORBED 0.5 ML: 5; 2.5; 8; 8; 2.5 SUSPENSION INTRAMUSCULAR at 14:38

## 2021-06-11 NOTE — ED PROVIDER NOTES
Subjective   Patient is a 35-year-old female with PMH of IVDU, Hepatitis C presenting to the ED complaining of a possible insect bite. Patient was cleaning a trailer this morning (21) at 2 am when she claims she was bit. Patient went to bed and when she woke up this morning she had red swelling and immense left arm pain. Patient speculates it is a spider bite but never saw anything. Patient attempted a cold compress but she speculates that it only spread the spot. Patient reports she hasn't shot up in 4-5 years. Patient denies fever. Patient denies dyspnea, cough. Patient is unsure of her tetanus status.          Review of Systems   Constitutional: Negative for fatigue and fever.   Respiratory: Negative for cough and shortness of breath.    Gastrointestinal: Negative for abdominal pain, nausea and vomiting.   Musculoskeletal: Positive for myalgias.        Patient reports she is unable to flex or extend her left hand.   Skin:        Patient reports red swelling on left forearm.   Neurological: Negative for headaches.        Patient denies loss of sensation in the left arm   All other systems reviewed and are negative.      Past Medical History:   Diagnosis Date   • Addiction (CMS/HCC)    • Anxiety    • Depression    • Hepatitis C    • Migraine        No Known Allergies    Past Surgical History:   Procedure Laterality Date   •  SECTION      x 3   • FOREARM FRACTURE SURGERY Right    • TUBAL ABDOMINAL LIGATION         Family History   Problem Relation Age of Onset   • No Known Problems Mother    • No Known Problems Father    • Colon cancer Neg Hx    • Colon polyps Neg Hx        Social History     Socioeconomic History   • Marital status:      Spouse name: Not on file   • Number of children: Not on file   • Years of education: Not on file   • Highest education level: Not on file   Tobacco Use   • Smoking status: Current Some Day Smoker     Packs/day: 0.50     Types: Electronic Cigarette, Cigarettes    • Smokeless tobacco: Never Used   Vaping Use   • Vaping Use: Every day   • Substances: Nicotine   Substance and Sexual Activity   • Alcohol use: No   • Drug use: Not Currently     Comment: sober for three years   • Sexual activity: Defer           Objective   Physical Exam  Constitutional:       General: She is not in acute distress.     Appearance: Normal appearance. She is not ill-appearing, toxic-appearing or diaphoretic.   HENT:      Head: Normocephalic and atraumatic.      Right Ear: External ear normal.      Left Ear: External ear normal.      Nose: Rhinorrhea present.      Comments: Patient was crying     Mouth/Throat:      Mouth: Mucous membranes are moist.      Pharynx: Oropharynx is clear.   Eyes:      General: No scleral icterus.        Right eye: No discharge.         Left eye: No discharge.      Extraocular Movements: Extraocular movements intact.      Conjunctiva/sclera: Conjunctivae normal.   Cardiovascular:      Pulses: Normal pulses.      Comments: Radial artery palpated and normal strength on left wrist  Pulmonary:      Effort: Pulmonary effort is normal. No respiratory distress.   Musculoskeletal:         General: Swelling and tenderness present. No deformity or signs of injury.      Cervical back: Normal range of motion and neck supple.      Comments: Decreased flexion, extension, ulnar and radial deviation of the left hand. Patient was extremely tender to palpation on the raised area.   Skin:     General: Skin is warm.      Capillary Refill: Capillary refill takes less than 2 seconds.      Comments: Patient has a substantial raised, erythematous, area on the anterior left arm. It covers a significant portion of the left arm. Perhaps small ulcer noted near the middle of the area. No discharge or bleeding noted.   Neurological:      General: No focal deficit present.      Mental Status: She is alert and oriented to person, place, and time.      Cranial Nerves: No cranial nerve deficit.       Sensory: No sensory deficit.      Motor: No weakness.      Coordination: Coordination normal.      Gait: Gait normal.      Comments: Sensation intact at and distal to the patient's raised area.   Psychiatric:         Mood and Affect: Mood normal.         Behavior: Behavior normal.         Thought Content: Thought content normal.         Judgment: Judgment normal.         Procedures           ED Course                                           MDM  Number of Diagnoses or Management Options  Cellulitis of left upper extremity: new and requires workup  Risk of Complications, Morbidity, and/or Mortality  Presenting problems: minimal  Diagnostic procedures: minimal  Management options: minimal    Patient Progress  Patient progress: stable      Final diagnoses:   Cellulitis of left upper extremity       ED Disposition  ED Disposition     ED Disposition Condition Comment    Discharge Stable           Miles Pinzon, APRN  [address] [facility] [place] [address]  [phone]    Schedule an appointment as soon as possible for a visit       [facility]  [address] [facility] [address] [phone]    If symptoms worsen         Medication List      New Prescriptions    clindamycin 300 MG capsule  Commonly known as: CLEOCIN  Take 1 capsule by mouth 3 (Three) Times a Day for 7 days.           Where to Get Your Medications      These medications were sent to [place], [place] - [address] [place] [address] - [phone]  - [phone] [address] [address]    Phone: [phone]   · clindamycin 300 MG capsule          Suleiman Estrada Jr., JOSE  06/11/21 2968

## 2021-07-07 ENCOUNTER — TELEPHONE (OUTPATIENT)
Dept: SURGERY | Facility: CLINIC | Age: 36
End: 2021-07-07

## 2021-07-07 NOTE — TELEPHONE ENCOUNTER
Patient no showed for appointment with Dr lorenz. Called patient she stated she forgot about appointment.Patient rescheduled appointment.

## 2021-07-12 ENCOUNTER — TELEPHONE (OUTPATIENT)
Dept: SURGERY | Facility: CLINIC | Age: 36
End: 2021-07-12

## 2021-07-28 ENCOUNTER — TELEPHONE (OUTPATIENT)
Dept: SURGERY | Facility: CLINIC | Age: 36
End: 2021-07-28

## 2021-07-28 NOTE — TELEPHONE ENCOUNTER
Patient no showed for appointment with Dr Patel. Called patient no answer,left message to call office and reschedule

## 2021-07-29 NOTE — TELEPHONE ENCOUNTER
Called patient she answered and rescheduled appointment inflammatory bowel disease stressed to her that to make sure that she comes to this appointment  That she had no showed 3 times.

## 2021-08-10 ENCOUNTER — TELEPHONE (OUTPATIENT)
Dept: SURGERY | Facility: CLINIC | Age: 36
End: 2021-08-10

## 2021-08-10 NOTE — TELEPHONE ENCOUNTER
Patient no showed for her appointment .   Called patient she stated that she had meant to call and reschedule that she had just started a new job. She said she needs appointment having rectal bleedingI I rescheduled her to next week and stressed to her that she needed to keep that appointment. Dr Patel made aware of patient no showing 4 times.

## 2021-08-26 ENCOUNTER — TELEPHONE (OUTPATIENT)
Dept: SURGERY | Facility: CLINIC | Age: 36
End: 2021-08-26

## 2021-08-26 NOTE — TELEPHONE ENCOUNTER
Called patient concerning appointment that she no showed for the 5th time with Dr Patel . She stated she was sorry that she had missed so many times and she knows she need to see Dr Patel and promised she would come to appointment if I would reschedule her

## 2022-05-20 ENCOUNTER — TRANSCRIBE ORDERS (OUTPATIENT)
Dept: ADMINISTRATIVE | Facility: HOSPITAL | Age: 37
End: 2022-05-20

## 2022-05-23 ENCOUNTER — TRANSCRIBE ORDERS (OUTPATIENT)
Dept: ADMINISTRATIVE | Facility: HOSPITAL | Age: 37
End: 2022-05-23

## 2022-05-23 DIAGNOSIS — G43.119 INTRACTABLE CLASSICAL MIGRAINE: Primary | ICD-10-CM

## 2022-09-19 ENCOUNTER — HOSPITAL ENCOUNTER (EMERGENCY)
Facility: HOSPITAL | Age: 37
Discharge: HOME | End: 2022-09-19
Attending: EMERGENCY MEDICINE | Admitting: EMERGENCY MEDICINE

## 2022-09-19 VITALS
RESPIRATION RATE: 16 BRPM | WEIGHT: 164.73 LBS | HEIGHT: 60 IN | DIASTOLIC BLOOD PRESSURE: 67 MMHG | TEMPERATURE: 97.5 F | HEART RATE: 63 BPM | SYSTOLIC BLOOD PRESSURE: 113 MMHG | OXYGEN SATURATION: 98 % | BODY MASS INDEX: 32.34 KG/M2

## 2022-09-19 DIAGNOSIS — R11.2 NAUSEA AND VOMITING, UNSPECIFIED VOMITING TYPE: Primary | ICD-10-CM

## 2022-09-19 LAB
ALBUMIN SERPL-MCNC: 4.3 G/DL (ref 3.5–5.2)
ALBUMIN/GLOB SERPL: 1.2 G/DL
ALP SERPL-CCNC: 141 U/L (ref 39–117)
ALT SERPL W P-5'-P-CCNC: 40 U/L (ref 1–33)
ANION GAP SERPL CALCULATED.3IONS-SCNC: 10.8 MMOL/L (ref 5–15)
AST SERPL-CCNC: 47 U/L (ref 1–32)
BACTERIA UR QL AUTO: ABNORMAL /HPF
BASOPHILS # BLD AUTO: 0.04 10*3/MM3 (ref 0–0.2)
BASOPHILS NFR BLD AUTO: 0.6 % (ref 0–1.5)
BILIRUB SERPL-MCNC: 0.6 MG/DL (ref 0–1.2)
BILIRUB UR QL STRIP: NEGATIVE
BUN SERPL-MCNC: 7 MG/DL (ref 6–20)
BUN/CREAT SERPL: 11.9 (ref 7–25)
CALCIUM SPEC-SCNC: 9.4 MG/DL (ref 8.6–10.5)
CHLORIDE SERPL-SCNC: 98 MMOL/L (ref 98–107)
CLARITY UR: ABNORMAL
CO2 SERPL-SCNC: 21.2 MMOL/L (ref 22–29)
COLOR UR: YELLOW
CREAT SERPL-MCNC: 0.59 MG/DL (ref 0.57–1)
DEPRECATED RDW RBC AUTO: 38.1 FL (ref 37–54)
EGFRCR SERPLBLD CKD-EPI 2021: 117.7 ML/MIN/1.73
EOSINOPHIL # BLD AUTO: 0.01 10*3/MM3 (ref 0–0.4)
EOSINOPHIL NFR BLD AUTO: 0.1 % (ref 0.3–6.2)
ERYTHROCYTE [DISTWIDTH] IN BLOOD BY AUTOMATED COUNT: 11.9 % (ref 12.3–15.4)
FLUAV RNA RESP QL NAA+PROBE: NOT DETECTED
FLUBV RNA RESP QL NAA+PROBE: NOT DETECTED
GLOBULIN UR ELPH-MCNC: 3.7 GM/DL
GLUCOSE SERPL-MCNC: 116 MG/DL (ref 65–99)
GLUCOSE UR STRIP-MCNC: NEGATIVE MG/DL
HCT VFR BLD AUTO: 39.8 % (ref 34–46.6)
HGB BLD-MCNC: 14 G/DL (ref 12–15.9)
HGB UR QL STRIP.AUTO: NEGATIVE
HYALINE CASTS UR QL AUTO: ABNORMAL /LPF
IMM GRANULOCYTES # BLD AUTO: 0.03 10*3/MM3 (ref 0–0.05)
IMM GRANULOCYTES NFR BLD AUTO: 0.4 % (ref 0–0.5)
KETONES UR QL STRIP: ABNORMAL
LEUKOCYTE ESTERASE UR QL STRIP.AUTO: ABNORMAL
LIPASE SERPL-CCNC: 24 U/L (ref 13–60)
LYMPHOCYTES # BLD AUTO: 1.27 10*3/MM3 (ref 0.7–3.1)
LYMPHOCYTES NFR BLD AUTO: 17.7 % (ref 19.6–45.3)
MCH RBC QN AUTO: 31.2 PG (ref 26.6–33)
MCHC RBC AUTO-ENTMCNC: 35.2 G/DL (ref 31.5–35.7)
MCV RBC AUTO: 88.6 FL (ref 79–97)
MONOCYTES # BLD AUTO: 0.28 10*3/MM3 (ref 0.1–0.9)
MONOCYTES NFR BLD AUTO: 3.9 % (ref 5–12)
NEUTROPHILS NFR BLD AUTO: 5.55 10*3/MM3 (ref 1.7–7)
NEUTROPHILS NFR BLD AUTO: 77.3 % (ref 42.7–76)
NITRITE UR QL STRIP: NEGATIVE
NRBC BLD AUTO-RTO: 0 /100 WBC (ref 0–0.2)
PH UR STRIP.AUTO: 7.5 [PH] (ref 5–8)
PLATELET # BLD AUTO: 193 10*3/MM3 (ref 140–450)
PMV BLD AUTO: 9.5 FL (ref 6–12)
POTASSIUM SERPL-SCNC: 3.9 MMOL/L (ref 3.5–5.2)
PROT SERPL-MCNC: 8 G/DL (ref 6–8.5)
PROT UR QL STRIP: ABNORMAL
RBC # BLD AUTO: 4.49 10*6/MM3 (ref 3.77–5.28)
RBC # UR STRIP: ABNORMAL /HPF
REF LAB TEST METHOD: ABNORMAL
SARS-COV-2 RNA RESP QL NAA+PROBE: NOT DETECTED
SODIUM SERPL-SCNC: 130 MMOL/L (ref 136–145)
SP GR UR STRIP: 1.02 (ref 1–1.03)
SQUAMOUS #/AREA URNS HPF: ABNORMAL /HPF
UROBILINOGEN UR QL STRIP: ABNORMAL
WBC # UR STRIP: ABNORMAL /HPF
WBC NRBC COR # BLD: 7.18 10*3/MM3 (ref 3.4–10.8)

## 2022-09-19 PROCEDURE — 80053 COMPREHEN METABOLIC PANEL: CPT | Performed by: PHYSICIAN ASSISTANT

## 2022-09-19 PROCEDURE — 81001 URINALYSIS AUTO W/SCOPE: CPT | Performed by: PHYSICIAN ASSISTANT

## 2022-09-19 PROCEDURE — 83690 ASSAY OF LIPASE: CPT | Performed by: PHYSICIAN ASSISTANT

## 2022-09-19 PROCEDURE — 85025 COMPLETE CBC W/AUTO DIFF WBC: CPT | Performed by: PHYSICIAN ASSISTANT

## 2022-09-19 PROCEDURE — 87636 SARSCOV2 & INF A&B AMP PRB: CPT | Performed by: PHYSICIAN ASSISTANT

## 2022-09-19 PROCEDURE — 99283 EMERGENCY DEPT VISIT LOW MDM: CPT

## 2022-09-19 PROCEDURE — 96374 THER/PROPH/DIAG INJ IV PUSH: CPT

## 2022-09-19 PROCEDURE — 25010000002 ONDANSETRON PER 1 MG: Performed by: PHYSICIAN ASSISTANT

## 2022-09-19 RX ORDER — ONDANSETRON 2 MG/ML
4 INJECTION INTRAMUSCULAR; INTRAVENOUS ONCE
Status: COMPLETED | OUTPATIENT
Start: 2022-09-19 | End: 2022-09-19

## 2022-09-19 RX ORDER — SODIUM CHLORIDE 0.9 % (FLUSH) 0.9 %
10 SYRINGE (ML) INJECTION AS NEEDED
Status: DISCONTINUED | OUTPATIENT
Start: 2022-09-19 | End: 2022-09-19 | Stop reason: HOSPADM

## 2022-09-19 RX ORDER — ONDANSETRON 4 MG/1
4 TABLET, ORALLY DISINTEGRATING ORAL EVERY 6 HOURS PRN
Qty: 8 TABLET | Refills: 0 | Status: SHIPPED | OUTPATIENT
Start: 2022-09-19 | End: 2022-09-21

## 2022-09-19 RX ADMIN — SODIUM CHLORIDE 1000 ML: 9 INJECTION, SOLUTION INTRAVENOUS at 10:50

## 2022-09-19 RX ADMIN — ONDANSETRON 4 MG: 2 INJECTION INTRAMUSCULAR; INTRAVENOUS at 10:51

## 2022-09-19 NOTE — ED PROVIDER NOTES
Subjective   History of Present Illness  Patient is a 37-year-old female history of addiction, anxiety, depression, migraines and hepatitis C presenting to the ER for evaluation of nausea and vomiting.  Patient states her symptoms began around 6 PM last night.  She states she has not been able to keep anything down.  She states she did try to take Pepto-Bismol.  She states she also has a nonproductive cough and soreness in her abdomen from coughing.  She thinks she may have had a fever last night but is unsure, does not have a thermometer to check.  She denies any severe headache, vision loss or changes, dizziness, syncope, hemoptysis, abdominal pain, dysuria, hematuria, or any other symptoms.  She is unsure whether she has been around any sick contacts.        Review of Systems   Constitutional: Positive for chills.   HENT: Negative.    Eyes: Negative.    Respiratory: Positive for cough. Negative for shortness of breath.    Cardiovascular: Negative.    Gastrointestinal: Positive for nausea and vomiting. Negative for abdominal pain.   Genitourinary: Negative.    Musculoskeletal: Negative.    Skin: Negative.    Allergic/Immunologic: Negative for immunocompromised state.   Neurological: Negative.    Psychiatric/Behavioral: Negative.        Past Medical History:   Diagnosis Date   • Addiction (HCC)    • Anxiety    • Depression    • Hepatitis C    • Migraine        No Known Allergies    Past Surgical History:   Procedure Laterality Date   •  SECTION      x 3   • FOREARM FRACTURE SURGERY Right    • TUBAL ABDOMINAL LIGATION         Family History   Problem Relation Age of Onset   • No Known Problems Mother    • No Known Problems Father    • Colon cancer Neg Hx    • Colon polyps Neg Hx        Social History     Socioeconomic History   • Marital status:    Tobacco Use   • Smoking status: Former Smoker     Packs/day: 0.50     Types: Electronic Cigarette, Cigarettes     Quit date: 2021     Years since  "quittin.0   • Smokeless tobacco: Never Used   Vaping Use   • Vaping Use: Every day   • Substances: Nicotine   Substance and Sexual Activity   • Alcohol use: No   • Drug use: Not Currently     Comment: sober for three years   • Sexual activity: Defer           Objective   Physical Exam  Vitals and nursing note reviewed.     /67   Pulse 63   Temp 97.5 °F (36.4 °C) (Oral)   Resp 16   Ht 152.4 cm (60\")   Wt 74.7 kg (164 lb 11.6 oz)   SpO2 98%   BMI 32.17 kg/m²     GEN: No acute distress, sitting upright in the stretcher.  Awake and alert. Does not appear septic or toxic.  She is answering questions appropriately.  Head: Normocephalic, atraumatic  Eyes: EOM intact  ENT: Mask in place per protocol  Chest: Nontender to palpation  Cardiovascular: Regular rate and rhythm  Lungs: Clear to auscultation bilaterally without adventitious sounds  Abdomen: Soft, nontender, nondistended, no peritoneal signs, no focal guarding   Extremities: No  edema, normal appearance, full range of motion without deficits  Neuro: GCS 15  Psych: Mood and affect are appropriate    Procedures           ED Course  ED Course as of 22 1639   Mon Sep 19, 2022   1114 WBC: 7.18 [LA]   1114 Hemoglobin: 14.0 [LA]   1114 Platelets: 193 [LA]   1116 Lipase: 24 [LA]   1116 Glucose(!): 116 [LA]   1116 BUN: 7 [LA]   1116 Creatinine: 0.59 [LA]   1116 Sodium(!): 130 [LA]   1116 Potassium: 3.9 [LA]   1116 Chloride: 98 [LA]   1116 CO2(!): 21.2 [LA]   1116 Calcium: 9.4 [LA]   1116 Total Protein: 8.0 [LA]   1116 ALT (SGPT)(!): 40 [LA]   1116 AST (SGOT)(!): 47 [LA]   1116 Alkaline Phosphatase(!): 141 [LA]   1116 Total Bilirubin: 0.6 [LA]   1116 Globulin: 3.7 [LA]   1116 A/G Ratio: 1.2 [LA]   1116 BUN/Creatinine Ratio: 11.9 [LA]   1116 Anion Gap: 10.8 [LA]   1116 eGFR: 117.7 [LA]   1117 LFTs stable/improved since previous [LA]   1142 RBC, UA: None Seen [LA]   1142 WBC, UA(!): 3-5 [LA]   1142 Bacteria, UA(!): 1+ [LA]   1142 Squamous Epithelial Cells, " UA(!): 7-12 [LA]   1142 Hyaline Casts, UA: None Seen [LA]   1142 Methodology:: Manual Light Microscopy [LA]   1142 Color, UA: Yellow [LA]   1143 Appearance, UA(!): Cloudy [LA]   1143 pH, UA: 7.5 [LA]   1143 Specific Gravity, UA: 1.024 [LA]   1143 Glucose: Negative [LA]   1143 Ketones, UA(!): 15 mg/dL (1+) [LA]   1143 Bilirubin, UA: Negative [LA]   1143 Blood, UA: Negative [LA]   1143 Protein, UA(!): 30 mg/dL (1+) [LA]   1143 Leukocytes, UA(!): Trace [LA]   1143 Nitrite, UA: Negative [LA]   1143 Urobilinogen, UA: 1.0 E.U./dL [LA]   1143 Patient's urine very contaminated with squamous cells. [LA]   1143 Patient states she feels much better, denies any significant abdominal pain at this time.  Through shared decision making, decided to defer CT scan at this time [LA]      ED Course User Index  [LA] Lois Hidalgo PA-C      Lab Results (last 24 hours)     Procedure Component Value Units Date/Time    CBC & Differential [[id number]]  (Abnormal) Collected: 09/19/22 1049    Specimen: Blood Updated: 09/19/22 1058    Narrative:      The following orders were created for panel order CBC & Differential.  Procedure                               Abnormality         Status                     ---------                               -----------         ------                     CBC Auto Differential[[id number]]        Abnormal            Final result                 Please view results for these tests on the individual orders.    Comprehensive Metabolic Panel [[id number]]  (Abnormal) Collected: 09/19/22 1049    Specimen: Blood Updated: 09/19/22 1116     Glucose 116 mg/dL      BUN 7 mg/dL      Creatinine 0.59 mg/dL      Sodium 130 mmol/L      Potassium 3.9 mmol/L      Chloride 98 mmol/L      CO2 21.2 mmol/L      Calcium 9.4 mg/dL      Total Protein 8.0 g/dL      Albumin 4.30 g/dL      ALT (SGPT) 40 U/L      AST (SGOT) 47 U/L      Alkaline Phosphatase 141 U/L      Total Bilirubin 0.6 mg/dL      Globulin 3.7 gm/dL      A/G Ratio 1.2  g/dL      BUN/Creatinine Ratio 11.9     Anion Gap 10.8 mmol/L      eGFR 117.7 mL/min/1.73      Comment: National Kidney Foundation and American Society of Nephrology (ASN) Task Force recommended calculation based on the Chronic Kidney Disease Epidemiology Collaboration (CKD-EPI) equation refit without adjustment for race.       Narrative:      GFR Normal >60  Chronic Kidney Disease <60  Kidney Failure <15      Lipase [[id number]]  (Normal) Collected: 09/19/22 1049    Specimen: Blood Updated: 09/19/22 1116     Lipase 24 U/L     CBC Auto Differential [[id number]]  (Abnormal) Collected: 09/19/22 1049    Specimen: Blood Updated: 09/19/22 1058     WBC 7.18 10*3/mm3      RBC 4.49 10*6/mm3      Hemoglobin 14.0 g/dL      Hematocrit 39.8 %      MCV 88.6 fL      MCH 31.2 pg      MCHC 35.2 g/dL      RDW 11.9 %      RDW-SD 38.1 fl      MPV 9.5 fL      Platelets 193 10*3/mm3      Neutrophil % 77.3 %      Lymphocyte % 17.7 %      Monocyte % 3.9 %      Eosinophil % 0.1 %      Basophil % 0.6 %      Immature Grans % 0.4 %      Neutrophils, Absolute 5.55 10*3/mm3      Lymphocytes, Absolute 1.27 10*3/mm3      Monocytes, Absolute 0.28 10*3/mm3      Eosinophils, Absolute 0.01 10*3/mm3      Basophils, Absolute 0.04 10*3/mm3      Immature Grans, Absolute 0.03 10*3/mm3      nRBC 0.0 /100 WBC     COVID-19 and FLU A/B PCR - Swab, Nasopharynx [[id number]]  (Normal) Collected: 09/19/22 1053    Specimen: Swab from Nasopharynx Updated: 09/19/22 1115     COVID19 Not Detected     Influenza A PCR Not Detected     Influenza B PCR Not Detected    Narrative:      Fact sheet for providers: https://www.fda.gov/media/025041/download    Fact sheet for patients: https://www.fda.gov/media/341509/download    Test performed by PCR.    Urinalysis With Microscopic If Indicated (No Culture) - Urine, Clean Catch [[id number]]  (Abnormal) Collected: 09/19/22 1122    Specimen: Urine, Clean Catch Updated: 09/19/22 1129     Color, UA Yellow     Appearance, UA Cloudy      pH, UA 7.5     Specific Gravity, UA 1.024     Glucose, UA Negative     Ketones, UA 15 mg/dL (1+)     Bilirubin, UA Negative     Blood, UA Negative     Protein, UA 30 mg/dL (1+)     Leuk Esterase, UA Trace     Nitrite, UA Negative     Urobilinogen, UA 1.0 E.U./dL    Urinalysis, Microscopic Only - Urine, Clean Catch [[id number]]  (Abnormal) Collected: 09/19/22 1122    Specimen: Urine, Clean Catch Updated: 09/19/22 1139     RBC, UA None Seen /HPF      WBC, UA 3-5 /HPF      Bacteria, UA 1+ /HPF      Squamous Epithelial Cells, UA 7-12 /HPF      Hyaline Casts, UA None Seen /LPF      Methodology Manual Light Microscopy                                               MDM  Number of Diagnoses or Management Options  Nausea and vomiting, unspecified vomiting type  Diagnosis management comments: On arrival, patient is stable patient is normotensive, afebrile, no acute distress, nontoxic in appearance.  Differential could include viral illness, electrolyte abnormalities, gastroenteritis, and other concerns.  She has no abdominal tenderness concerning for cholecystitis or appendicitis.  We will obtain basic labs including a lipase, urinalysis, rapid influenza and COVID screen.  Will give IV fluids and Zofran.    Patient's influenza and COVID-negative.  Urine had no obvious signs of infection, contaminated with squamous cells.  She denies any urinary symptoms.  Her labs are stable without significant abnormalities in comparison to previous.  She has no abdominal pain or tenderness.  Symptoms resolved with fluids and medications.  Patient states that she needs to leave at this time, do believe she is safe for discharge without CT scan given lab findings and exam.  We discussed follow-up with primary care provider and strict return precautions.  She verbalized understanding was in agreement with this plan of care       Amount and/or Complexity of Data Reviewed  Clinical lab tests: reviewed and ordered  Decide to obtain previous medical  records or to obtain history from someone other than the patient: yes  Review and summarize past medical records: yes  Discuss the patient with other providers: yes    Risk of Complications, Morbidity, and/or Mortality  Presenting problems: moderate  Diagnostic procedures: moderate  Management options: low    Patient Progress  Patient progress: improved      Final diagnoses:   Nausea and vomiting, unspecified vomiting type       ED Disposition  ED Disposition     ED Disposition   Discharge    Condition   Stable    Comment   --             Dia Sam, NP-C  [address] [place] [address] [phone]    Schedule an appointment as soon as possible for a visit            Medication List      New Prescriptions    ondansetron ODT 4 MG disintegrating tablet  Commonly known as: ZOFRAN-ODT  Place 1 tablet on the tongue Every 6 (Six) Hours As Needed for Nausea or Vomiting for up to 2 days.           Where to Get Your Medications      These medications were sent to [facility] - [place], [place] - [address] [place] [address] - [phone]  - [phone] [address] [address]    Phone: [phone]   · ondansetron ODT 4 MG disintegrating tablet          Lois Hidalgo PA-C  09/19/22 5117

## 2022-09-19 NOTE — DISCHARGE INSTRUCTIONS
Your lab work was stable here today. You can take ondansetron as needed for nausea and vomiting.  Try to eat a bland diet for the next few days.  Follow-up with your primary care provider in the next few days to ensure you are improving.  Return to the ER for any change or worsening symptoms, or any additional concerns including but not limited to severe abdominal pain, intractable vomiting, fever greater than 100.4.

## 2024-09-30 PROBLEM — R05.9 COUGH: Status: ACTIVE | Noted: 2024-09-30

## 2025-03-25 ENCOUNTER — OFFICE VISIT (OUTPATIENT)
Dept: PSYCHIATRY | Facility: CLINIC | Age: 40
End: 2025-03-25
Payer: MEDICAID

## 2025-03-25 ENCOUNTER — LAB (OUTPATIENT)
Dept: LAB | Facility: HOSPITAL | Age: 40
End: 2025-03-25
Payer: MEDICAID

## 2025-03-25 VITALS
DIASTOLIC BLOOD PRESSURE: 74 MMHG | OXYGEN SATURATION: 97 % | HEART RATE: 82 BPM | BODY MASS INDEX: 29.45 KG/M2 | WEIGHT: 150 LBS | SYSTOLIC BLOOD PRESSURE: 108 MMHG | HEIGHT: 60 IN

## 2025-03-25 DIAGNOSIS — F31.81 BIPOLAR 2 DISORDER: ICD-10-CM

## 2025-03-25 DIAGNOSIS — F11.20 OPIOID USE DISORDER, SEVERE, ON MAINTENANCE THERAPY: ICD-10-CM

## 2025-03-25 DIAGNOSIS — F15.21 METHAMPHETAMINE USE DISORDER, SEVERE, IN EARLY REMISSION: ICD-10-CM

## 2025-03-25 DIAGNOSIS — F41.1 GENERALIZED ANXIETY DISORDER: ICD-10-CM

## 2025-03-25 DIAGNOSIS — F13.20 SEDATIVE, HYPNOTIC OR ANXIOLYTIC USE DISORDER, SEVERE, DEPENDENCE: ICD-10-CM

## 2025-03-25 DIAGNOSIS — F11.20 OPIOID USE DISORDER, SEVERE, ON MAINTENANCE THERAPY: Primary | ICD-10-CM

## 2025-03-25 DIAGNOSIS — F12.20 CANNABIS USE DISORDER, MODERATE, DEPENDENCE: ICD-10-CM

## 2025-03-25 LAB
AMPHET+METHAMPHET UR QL: NEGATIVE
AMPHETAMINES UR QL: NEGATIVE
BARBITURATES UR QL SCN: NEGATIVE
BENZODIAZ UR QL SCN: POSITIVE
BUPRENORPHINE SERPL-MCNC: POSITIVE NG/ML
CANNABINOIDS SERPL QL: POSITIVE
COCAINE UR QL: NEGATIVE
METHADONE UR QL SCN: NEGATIVE
OPIATES UR QL: NEGATIVE
OXYCODONE UR QL SCN: NEGATIVE
PCP UR QL SCN: NEGATIVE
TRICYCLICS UR QL SCN: NEGATIVE

## 2025-03-25 PROCEDURE — 1159F MED LIST DOCD IN RCRD: CPT | Performed by: NURSE PRACTITIONER

## 2025-03-25 PROCEDURE — 90792 PSYCH DIAG EVAL W/MED SRVCS: CPT | Performed by: NURSE PRACTITIONER

## 2025-03-25 PROCEDURE — 80306 DRUG TEST PRSMV INSTRMNT: CPT

## 2025-03-25 PROCEDURE — 1160F RVW MEDS BY RX/DR IN RCRD: CPT | Performed by: NURSE PRACTITIONER

## 2025-03-25 RX ORDER — METHOCARBAMOL 750 MG/1
1 TABLET, FILM COATED ORAL EVERY 12 HOURS SCHEDULED
COMMUNITY
Start: 2024-12-04

## 2025-03-25 RX ORDER — HYDROXYZINE PAMOATE 25 MG/1
1 CAPSULE ORAL 3 TIMES DAILY
COMMUNITY
Start: 2024-11-26

## 2025-03-25 RX ORDER — PRAZOSIN HYDROCHLORIDE 5 MG/1
5 CAPSULE ORAL
COMMUNITY
Start: 2025-01-23
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
## 2025-03-27 LAB — REF LAB TEST METHOD: NORMAL

## 2025-03-31 ENCOUNTER — TELEPHONE (OUTPATIENT)
Dept: PSYCHIATRY | Facility: CLINIC | Age: 40
End: 2025-03-31
Payer: MEDICAID

## 2025-03-31 DIAGNOSIS — F41.1 GENERALIZED ANXIETY DISORDER: ICD-10-CM

## 2025-03-31 DIAGNOSIS — F31.81 BIPOLAR 2 DISORDER: Primary | ICD-10-CM

## 2025-03-31 LAB
6-ACETYLMORPHINE: NOT DETECTED NG/ML
6MAM SERPLBLD-MCNC: NOT DETECTED NG/ML
9-DELTA-THC-COOH: NORMAL NG/ML
ALCOHOL, ETHYL: NOT DETECTED NG/ML
AMPHET SAL QL CFM: NOT DETECTED NG/ML
AMPHETAMINE: NOT DETECTED NG/ML
BENZODIAZ BLD QL: NOT DETECTED NG/ML
BENZOYLECGONINE: NOT DETECTED NG/ML
BUPRENORPHINE SAL CFM-MCNC: DETECTED NG/ML
BUPRENORPHINE: NORMAL NG/ML
COCAINE METABOLITE: NOT DETECTED NG/ML
EDDP SERPL QL: NOT DETECTED NG/ML
EDDP: NOT DETECTED NG/ML
FENTANYL SAL QL SCN: NOT DETECTED NG/ML
FENTANYL-EIA: NOT DETECTED NG/ML
GABAPENTIN: NOT DETECTED NG/ML
METHADONE: NOT DETECTED NG/ML
METHAMPHET/CREAT UR: NOT DETECTED NG/ML
METHAMPHETAMINE: NOT DETECTED NG/ML
METHYLPHENIDATE: NOT DETECTED NG/ML
NORBUPRENORPHINE: NORMAL NG/ML
NORFENTANYL: NOT DETECTED NG/ML
NOROXYCODONE: NOT DETECTED NG/ML
O-DESMETHYLTRAMADOL: NOT DETECTED NG/ML
OPIATES: NOT DETECTED NG/ML
OXYCODONE UR: NOT DETECTED NG/ML
OXYCODONE: NOT DETECTED NG/ML
OXYMORPHONE: NOT DETECTED NG/ML
PCP SAL CFM-MCNC: NOT DETECTED NG/ML
PCP: NOT DETECTED NG/ML
PHENTERMINE: NOT DETECTED NG/ML
PREGABALIN: NOT DETECTED NG/ML
THC: DETECTED NG/ML
TRAMADOL UR CFM-MCNC: NOT DETECTED NG/ML
TRICYCLIC ANTIDEPRESSANTS: NOT DETECTED NG/ML
ZOLPIDEM: NOT DETECTED NG/ML

## 2025-03-31 NOTE — TELEPHONE ENCOUNTER
Please call and let her know I received her [facility] results. I will give her a copy next time I see her. I would like to increase her Seroquel as it is used for bipolar depression, anxiety, and sleep. If she has been taking 50mg consistently, I would like to increase it to 100mg nightly and follow up in 2 weeks. How does this sound? If she thinks it will be too sedating or other issues, we can come up with a different plan.

## 2025-03-31 NOTE — TELEPHONE ENCOUNTER
Patient has been advised and is agreeable to increase Seroquel to 100 mg she said she has been taking 50 mg consistently. She also wanted to ask if you would prescribe her Zanaflex 4 mg, I advised generally we do not but she wanted to ask. Follow up scheduled in two weeks.

## 2025-03-31 NOTE — TELEPHONE ENCOUNTER
Attempted to call patient. VM not set up. If she calls back, please let her know that I see she picked up a 30 days supply of Xanax on 3/25/2025.  As we discussed previously, this is not something that she can take during CD-IOP. I will hold off on sending higher Seroquel dose until she stops the Xanax. Does she think she needs to go to detox to come back off of it? As we discussed before, there is a lot of room to maximize her current psychiatric medication regimen. Use of a benzodiazepine in the presence of a use disorder in our chemical dependency program is not allowed at this time. Also, she would need to get Zanaflex from PCP. However, I do not recommend she start that at this time either due to risk of sedation.

## 2025-04-01 NOTE — TELEPHONE ENCOUNTER
Saw pt at [facility]. Was unable to have a private conversation with her. Informed she needs to call office tomorrow and speak with MA or myself.

## 2025-04-02 ENCOUNTER — OFFICE VISIT (OUTPATIENT)
Dept: PSYCHIATRY | Facility: CLINIC | Age: 40
End: 2025-04-02
Payer: MEDICAID

## 2025-04-02 DIAGNOSIS — F41.1 GENERALIZED ANXIETY DISORDER: ICD-10-CM

## 2025-04-02 DIAGNOSIS — F15.21 METHAMPHETAMINE USE DISORDER, SEVERE, IN EARLY REMISSION: ICD-10-CM

## 2025-04-02 DIAGNOSIS — F31.81 BIPOLAR 2 DISORDER: Primary | ICD-10-CM

## 2025-04-02 DIAGNOSIS — F12.20 MODERATE CANNABIS USE DISORDER: ICD-10-CM

## 2025-04-02 DIAGNOSIS — F11.20 OPIOID USE DISORDER, SEVERE, ON MAINTENANCE THERAPY: Primary | ICD-10-CM

## 2025-04-02 DIAGNOSIS — F13.20 SEDATIVE, HYPNOTIC OR ANXIOLYTIC USE DISORDER, SEVERE, DEPENDENCE: ICD-10-CM

## 2025-04-02 PROCEDURE — 90791 PSYCH DIAGNOSTIC EVALUATION: CPT | Performed by: SOCIAL WORKER

## 2025-04-02 RX ORDER — QUETIAPINE FUMARATE 100 MG/1
100 TABLET, FILM COATED ORAL
Qty: 30 TABLET | Refills: 0 | Status: SHIPPED | OUTPATIENT
Start: 2025-04-02

## 2025-04-02 NOTE — TELEPHONE ENCOUNTER
Spoke with patient.  She is aware that she cannot be on alprazolam and CD-IOP with current benzodiazepine use disorder.  She is agreeable to stop taking.  She declines inpatient detox and feels she will be fine coming off them as she has not been taking very long.  Once she has stopped, she will increase Seroquel to 100 mg nightly and keep previously scheduled follow-up appointment.  Will start CD-IOP on Monday, 4/7/2025.  Will go to ER/911 with any issues.

## 2025-04-07 ENCOUNTER — OFFICE VISIT (OUTPATIENT)
Dept: PSYCHIATRY | Facility: HOSPITAL | Age: 40
End: 2025-04-07
Payer: MEDICAID

## 2025-04-07 DIAGNOSIS — F15.21 METHAMPHETAMINE USE DISORDER, SEVERE, IN EARLY REMISSION: ICD-10-CM

## 2025-04-07 DIAGNOSIS — F11.20 OPIOID USE DISORDER, SEVERE, ON MAINTENANCE THERAPY: Primary | ICD-10-CM

## 2025-04-07 DIAGNOSIS — F13.20 SEDATIVE, HYPNOTIC OR ANXIOLYTIC USE DISORDER, SEVERE, DEPENDENCE: ICD-10-CM

## 2025-04-07 DIAGNOSIS — F12.20 MODERATE CANNABIS USE DISORDER: ICD-10-CM

## 2025-04-07 PROCEDURE — H0015 ALCOHOL AND/OR DRUG SERVICES: HCPCS | Performed by: SOCIAL WORKER
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
## 2025-04-09 ENCOUNTER — OFFICE VISIT (OUTPATIENT)
Dept: PSYCHIATRY | Facility: HOSPITAL | Age: 40
End: 2025-04-09
Payer: MEDICAID

## 2025-04-09 ENCOUNTER — LAB (OUTPATIENT)
Dept: LAB | Facility: HOSPITAL | Age: 40
End: 2025-04-09
Payer: MEDICAID

## 2025-04-09 DIAGNOSIS — F12.20 CANNABIS USE DISORDER, MODERATE, DEPENDENCE: ICD-10-CM

## 2025-04-09 DIAGNOSIS — F15.21 METHAMPHETAMINE USE DISORDER, SEVERE, IN EARLY REMISSION: ICD-10-CM

## 2025-04-09 DIAGNOSIS — F11.20 OPIOID USE DISORDER, SEVERE, ON MAINTENANCE THERAPY: Primary | ICD-10-CM

## 2025-04-09 DIAGNOSIS — F11.20 OPIOID USE DISORDER, SEVERE, ON MAINTENANCE THERAPY: ICD-10-CM

## 2025-04-09 DIAGNOSIS — F13.20 SEDATIVE, HYPNOTIC OR ANXIOLYTIC USE DISORDER, SEVERE, DEPENDENCE: ICD-10-CM

## 2025-04-09 DIAGNOSIS — F12.20 MODERATE CANNABIS USE DISORDER: ICD-10-CM

## 2025-04-09 PROCEDURE — 80306 DRUG TEST PRSMV INSTRMNT: CPT

## 2025-04-09 PROCEDURE — H0015 ALCOHOL AND/OR DRUG SERVICES: HCPCS | Performed by: SOCIAL WORKER

## 2025-04-11 LAB — REF LAB TEST METHOD: NORMAL

## 2025-04-14 ENCOUNTER — LAB (OUTPATIENT)
Dept: LAB | Facility: HOSPITAL | Age: 40
End: 2025-04-14
Payer: MEDICAID

## 2025-04-14 DIAGNOSIS — F11.20 OPIOID USE DISORDER, SEVERE, ON MAINTENANCE THERAPY: ICD-10-CM

## 2025-04-14 DIAGNOSIS — F12.20 CANNABIS USE DISORDER, MODERATE, DEPENDENCE: ICD-10-CM

## 2025-04-14 DIAGNOSIS — F15.21 METHAMPHETAMINE USE DISORDER, SEVERE, IN EARLY REMISSION: ICD-10-CM

## 2025-04-14 DIAGNOSIS — F13.20 SEDATIVE, HYPNOTIC OR ANXIOLYTIC USE DISORDER, SEVERE, DEPENDENCE: ICD-10-CM

## 2025-04-14 LAB
6-ACETYLMORPHINE: NOT DETECTED NG/ML
6MAM SERPLBLD-MCNC: NOT DETECTED NG/ML
7- AMINOCLONAZEPAM: NORMAL NG/ML
9-DELTA-THC-COOH: NORMAL NG/ML
ALCOHOL, ETHYL: NOT DETECTED NG/ML
ALPHA-HYDROXYALPRAZOLAM: NORMAL NG/ML
ALPRAZ SPEC-MCNC: NORMAL NG/ML
AMPHET SAL QL CFM: NOT DETECTED NG/ML
AMPHET+METHAMPHET UR QL: NEGATIVE
AMPHETAMINE: NOT DETECTED NG/ML
AMPHETAMINES UR QL: NEGATIVE
BARBITURATES UR QL SCN: NEGATIVE
BENZODIAZ BLD QL: DETECTED NG/ML
BENZODIAZ UR QL SCN: POSITIVE
BENZOYLECGONINE: NOT DETECTED NG/ML
BUPRENORPHINE SAL CFM-MCNC: DETECTED NG/ML
BUPRENORPHINE SERPL-MCNC: POSITIVE NG/ML
BUPRENORPHINE: NORMAL NG/ML
CANNABINOIDS SERPL QL: POSITIVE
CLONAZEPAM: NOT DETECTED NG/ML
COCAINE METABOLITE: NOT DETECTED NG/ML
COCAINE UR QL: NEGATIVE
DIAZEPAM: NOT DETECTED NG/ML
EDDP SERPL QL: NOT DETECTED NG/ML
EDDP: NOT DETECTED NG/ML
FENTANYL SAL QL SCN: NOT DETECTED NG/ML
FENTANYL-EIA: NOT DETECTED NG/ML
FLUNITRAZEPAM SERPLBLD-MCNC: NOT DETECTED NG/ML
FLURAZEPAM BLD CFM-MCNC: NOT DETECTED NG/ML
GABAPENTIN: NOT DETECTED NG/ML
LORAZEPAM: NOT DETECTED NG/ML
METHADONE UR QL SCN: NEGATIVE
METHADONE: NOT DETECTED NG/ML
METHAMPHET/CREAT UR: NOT DETECTED NG/ML
METHAMPHETAMINE: NOT DETECTED NG/ML
METHYLPHENIDATE: NOT DETECTED NG/ML
MIDAZOLAM, URINE: NOT DETECTED NG/ML
NORBUPRENORPHINE: NORMAL NG/ML
NORDIAZEPAM: NOT DETECTED NG/ML
NORFENTANYL: NOT DETECTED NG/ML
NOROXYCODONE: NOT DETECTED NG/ML
O-DESMETHYLTRAMADOL: NOT DETECTED NG/ML
OPIATES UR QL: NEGATIVE
OPIATES: NOT DETECTED NG/ML
OXAZEPAM: NOT DETECTED NG/ML
OXYCODONE UR QL SCN: NEGATIVE
OXYCODONE UR: NOT DETECTED NG/ML
OXYCODONE: NOT DETECTED NG/ML
OXYMORPHONE: NOT DETECTED NG/ML
PCP SAL CFM-MCNC: NOT DETECTED NG/ML
PCP UR QL SCN: NEGATIVE
PCP: NOT DETECTED NG/ML
PHENTERMINE: NOT DETECTED NG/ML
PREGABALIN: NOT DETECTED NG/ML
TEMAZEPAM: NOT DETECTED NG/ML
THC: DETECTED NG/ML
TRAMADOL UR CFM-MCNC: NOT DETECTED NG/ML
TRICYCLIC ANTIDEPRESSANTS: NOT DETECTED NG/ML
TRICYCLICS UR QL SCN: POSITIVE
ZOLPIDEM: NOT DETECTED NG/ML

## 2025-04-14 PROCEDURE — 80306 DRUG TEST PRSMV INSTRMNT: CPT

## 2025-04-16 LAB — REF LAB TEST METHOD: NORMAL

## 2025-04-16 NOTE — PROGRESS NOTES
"CD IOP GROUP     Date: 04/07/2025  Name: Ema Phan    Time In: 1800   Time Out: 2050     Number of participants: 12    IOP GROUP NOTE     Services Provided  Group Psychotherapy: 3 hour IOP group therapy session (Check-ins, Coping Skills, Relapse Prevention)  Education and Training:  Activity Therapy:  Individual Therapy:  Family Therapy:  MD Initial:  MD Follow-Up     Check Ins: Therapist continued facilitation of rapport building strategies between group members. Therapist asked that each patient check in with home life and recovery efforts and identify triggers, cravings, and high risk situations that arise between group sessions. Therapist provided empathy and support during group session.     Session Content/Coping Skills: Steve ([facility] ) and Anjana (Behavioral Health Student/) attended for first part of session. Individual check ins completed by group members. Just for Today reading reviewed and discussed. Clinician explored with group members how addiction impacted biological, psychological, emotional, and social health. Living in Balance- Session 6 (Spirituality) began. Group members shared takeaways from session.      Response: Patient attended class in person. Patient participated in completion of check in form. Patient on check in form answered no to question of \"currently or in the past 7 days, have you had any suicidal thoughts or plan or intent to hurt yourself”, and patient also answered no on check in form to question of \"currently or in the past 7 days, have you had any homicidal thoughts or plan or intent to hurt others\". Patient on check in form reported utilizing new supports in the past 7 days and concerns with sleep in the past 7 days.     Patient reported appetite concerns and reported that she is coming off Xanax but has a doctors appointment tomorrow. Patient reports last suicidal thought was years ago. Patient was advised to go to " hospital with withdrawal symptoms.     Personal Assessment 0-10 Scale (0-none, 10-high)    Anxiety:  10   Depression:  8   Cravings: none     Assessment:     ..  Lab on 03/25/2025   Component Date Value Ref Range Status    THC, Screen, Urine 03/25/2025 Positive (A)  Negative Final    Phencyclidine (PCP), Urine 03/25/2025 Negative  Negative Final    Cocaine Screen, Urine 03/25/2025 Negative  Negative Final    Methamphetamine, Ur 03/25/2025 Negative  Negative Final    Opiate Screen 03/25/2025 Negative  Negative Final    Amphetamine Screen, Urine 03/25/2025 Negative  Negative Final    Benzodiazepine Screen, Urine 03/25/2025 Positive (A)  Negative Final    Tricyclic Antidepressants Screen 03/25/2025 Negative  Negative Final    Methadone Screen, Urine 03/25/2025 Negative  Negative Final    Barbiturates Screen, Urine 03/25/2025 Negative  Negative Final    Oxycodone Screen, Urine 03/25/2025 Negative  Negative Final    Buprenorphine, Screen, Urine 03/25/2025 Positive (A)  Negative Final    METHAMPHETAMINE 03/25/2025 Not Detected  500 ng/mL ng/mL Final    Opiates 03/25/2025 Not Detected  300 ng/mL ng/mL Final    OXYCODONE 03/25/2025 Not Detected  100 ng/mL ng/mL Final    PCP 03/25/2025 Not Detected  25 ng/mL ng/mL Final    THC 03/25/2025 Detected  50 ng/mL ng/mL Final    TRICYCLIC ANTIDEPRESSANTS 03/25/2025 Not Detected  300 ng/mL ng/mL Final    6-ACETYLMORPHINE 03/25/2025 Not Detected  10 ng/mL ng/mL Final    AMPHETAMINE 03/25/2025 Not Detected  500 ng/mL ng/mL Final    Barbiturates 03/25/2025 Not Detected  200 ng/mL ng/mL Final    Benzodiazepines 03/25/2025 Not Detected  200 ng/mL ng/mL Final    BUPRENORPHINE 03/25/2025 Detected  5 ng/mL ng/mL Final    Cocaine Metabolite 03/25/2025 Not Detected  150 ng/mL ng/mL Final    EDDP 03/25/2025 Not Detected  100 ng/mL ng/mL Final    ALCOHOL, ETHYL 03/25/2025 Not Detected  <100.0000 ng/mL Final    FENTANYL 03/25/2025 Not Detected  1 ng/mL ng/mL Final    Reference Lab Report  03/25/2025    Final    See Full Screen for results.    ZOLPIDEM 03/25/2025 Not Detected  2 ng/mL ng/mL Final    O-Desmethyltramadol 03/25/2025 Not Detected  100 ng/mL ng/mL Final    Tramadol 03/25/2025 Not Detected  50 ng/mL ng/mL Final    9-DELTA-THC-COOH 03/25/2025 66^NOT DETECTED  100 ng/mL ng/mL Final    Not Detected    6-acetylmorphine 03/25/2025 Not Detected  10 ng/mL ng/mL Final    PHENCYCLIDINE 03/25/2025 Not Detected  20 ng/mL ng/mL Final    PREGABALIN 03/25/2025 Not Detected  250 ng/mL ng/mL Final    NOROXYCODONE 03/25/2025 Not Detected  50 ng/mL ng/mL Final    Oxycodone, Confirmation, Urine 03/25/2025 Not Detected  50 ng/mL ng/mL Final    OXYMORPHONE 03/25/2025 Not Detected  50 ng/mL ng/mL Final    METHAMPHETAMINE 03/25/2025 Not Detected  100 ng/mL ng/mL Final    EDDP 03/25/2025 Not Detected  50 ng/mL ng/mL Final    METHADONE 03/25/2025 Not Detected  25 ng/mL ng/mL Final    GABAPENTIN 03/25/2025 Not Detected  500 ng/mL ng/mL Final    BUPRENORPHINE 03/25/2025 34^DETECTED  10 ng/mL ng/mL Final    Detected    NORBUPRENORPHINE 03/25/2025 32^DETECTED  10 ng/mL ng/mL Final    Detected    BENZOYLECGONINE 03/25/2025 Not Detected  100 ng/mL ng/mL Final    AMPHETAMINE 03/25/2025 Not Detected  250 ng/mL ng/mL Final    PHENTERMINE 03/25/2025 Not Detected  100 ng/mL ng/mL Final    FENTANYL 03/25/2025 Not Detected  2 ng/mL ng/mL Final    NORFENTANYL 03/25/2025 Not Detected  10 ng/mL ng/mL Final    METHYLPHENIDATE 03/25/2025 Not Detected  10 ng/mL ng/mL Final       Mental Status Exam  Hygiene:  good  Dress: casual  Attitude: cooperative and agreeable   Motor Activity: appropriate  Eye Contact:  good  Speech: regular rate and rhythm   Mood:  calm and cooperative  Affect:  Appropriate  Thought Processes:  Linear  Thought Content:  Normal  Suicidal Thoughts:  denies  Homicidal Thoughts:  denies  Crisis Safety Plan: Safety plan has been discussed.   Hallucinations:  Unknown to clinician.   Reliability: fair  Insight:  fair  Judgement: fair  Impulse Control: fair    Recovery/spiritual support group attendance: No.      Progress toward goal: Not at goal    Prognosis: Fair with Ongoing Treatment     Self-reported number of days sober: Patient on check in form reported 7 days from doctor.     Patient will contact this office, call 911 or present to the nearest emergency room should suicidal or homicidal ideations occur.    Impression/Formulation:    ICD-10-CM ICD-9-CM   1. Opioid use disorder, severe, on maintenance therapy  F11.20 304.00   2. Methamphetamine use disorder, severe, in early remission  F15.21 304.43   3. Sedative, hypnotic or anxiolytic use disorder, severe, dependence  F13.20 304.10   4. Moderate cannabis use disorder  F12.20 304.30       Clinical Maneuvering/Interventions: Therapist utilized a person-centered approach to build rapport with group member. Therapist implemented motivational interviewing techniques to assist client with exploring and resolving ambivalence associated with commitment to change behaviors related to substance use and addiction. Therapist applied cognitive behavioral strategies to facilitate identification of maladaptive patterns of thinking and behavior that contribute to client's risk for continued substance use and relapse. Therapist employed group interaction activities to build rapport among group members, promote sobriety, and emphasize relapse prevention. Therapist promoted safe nonjudgmental environment by providing group members with unconditional positive regard and encouraging group members to comply with group rules and guidelines. Therapist assisted group member with identifying and implementing healthier coping strategies.      Plan:  Continue [facility] IOP Phase I   Aftercare:  [facility] Phase II  Program Assignments:  Personal recovery plan, relapse prevention plan, attendance of recovery support group meetings,  exploration of sponsorship, drug/alcohol screens.     Andre Zuñiga LCSW  4/16/2025      Part of this note may be an electronic transcription/translation of spoken language to printed text using the Dragon Dictation System.

## 2025-04-17 ENCOUNTER — OFFICE VISIT (OUTPATIENT)
Dept: PSYCHIATRY | Facility: HOSPITAL | Age: 40
End: 2025-04-17
Payer: MEDICAID

## 2025-04-17 DIAGNOSIS — F12.20 MODERATE CANNABIS USE DISORDER: ICD-10-CM

## 2025-04-17 DIAGNOSIS — F15.21 METHAMPHETAMINE USE DISORDER, SEVERE, IN EARLY REMISSION: ICD-10-CM

## 2025-04-17 DIAGNOSIS — F11.20 OPIOID USE DISORDER, SEVERE, ON MAINTENANCE THERAPY: Primary | ICD-10-CM

## 2025-04-17 DIAGNOSIS — F13.20 SEDATIVE, HYPNOTIC OR ANXIOLYTIC USE DISORDER, SEVERE, DEPENDENCE: ICD-10-CM

## 2025-04-17 LAB
6-ACETYLMORPHINE: NOT DETECTED NG/ML
6MAM SERPLBLD-MCNC: NOT DETECTED NG/ML
7- AMINOCLONAZEPAM: NOT DETECTED NG/ML
9-DELTA-THC-COOH: NORMAL NG/ML
ALCOHOL, ETHYL: NOT DETECTED NG/ML
ALPHA-HYDROXYALPRAZOLAM: NORMAL NG/ML
ALPRAZ SPEC-MCNC: NORMAL NG/ML
AMPHET SAL QL CFM: NOT DETECTED NG/ML
AMPHETAMINE: NOT DETECTED NG/ML
BENZODIAZ BLD QL: DETECTED NG/ML
BENZOYLECGONINE: NOT DETECTED NG/ML
BUPRENORPHINE SAL CFM-MCNC: DETECTED NG/ML
BUPRENORPHINE: NORMAL NG/ML
CLONAZEPAM: NOT DETECTED NG/ML
COCAINE METABOLITE: NOT DETECTED NG/ML
DIAZEPAM: NOT DETECTED NG/ML
EDDP SERPL QL: NOT DETECTED NG/ML
EDDP: NOT DETECTED NG/ML
FENTANYL SAL QL SCN: NOT DETECTED NG/ML
FENTANYL-EIA: NOT DETECTED NG/ML
FLUNITRAZEPAM SERPLBLD-MCNC: NOT DETECTED NG/ML
FLURAZEPAM BLD CFM-MCNC: NOT DETECTED NG/ML
GABAPENTIN: NOT DETECTED NG/ML
LORAZEPAM: NOT DETECTED NG/ML
METHADONE: NOT DETECTED NG/ML
METHAMPHET/CREAT UR: NOT DETECTED NG/ML
METHAMPHETAMINE: NOT DETECTED NG/ML
METHYLPHENIDATE: NOT DETECTED NG/ML
MIDAZOLAM, URINE: NOT DETECTED NG/ML
NORBUPRENORPHINE: NORMAL NG/ML
NORDIAZEPAM: NOT DETECTED NG/ML
NORFENTANYL: NOT DETECTED NG/ML
NOROXYCODONE: NOT DETECTED NG/ML
O-DESMETHYLTRAMADOL: NOT DETECTED NG/ML
OPIATES: NOT DETECTED NG/ML
OXAZEPAM: NOT DETECTED NG/ML
OXYCODONE UR: NOT DETECTED NG/ML
OXYCODONE: NOT DETECTED NG/ML
OXYMORPHONE: NOT DETECTED NG/ML
PCP SAL CFM-MCNC: NOT DETECTED NG/ML
PCP: NOT DETECTED NG/ML
PHENTERMINE: NOT DETECTED NG/ML
PREGABALIN: NOT DETECTED NG/ML
TEMAZEPAM: NOT DETECTED NG/ML
THC: DETECTED NG/ML
TRAMADOL UR CFM-MCNC: NOT DETECTED NG/ML
TRICYCLIC ANTIDEPRESSANTS: NOT DETECTED NG/ML
ZOLPIDEM: NOT DETECTED NG/ML

## 2025-04-17 PROCEDURE — H0015 ALCOHOL AND/OR DRUG SERVICES: HCPCS | Performed by: SOCIAL WORKER

## 2025-04-17 NOTE — PROGRESS NOTES
[facility] IOP GROUP     Date: 04/09/2025  Name: Ema Phan    Time In: 1800   Time Out: 2050     Number of participants: 13    IOP GROUP NOTE     Services Provided  Group Psychotherapy: 3 hour IOP group therapy session (Check-ins, Coping Skills, Relapse Prevention)  Education and Training:  Activity Therapy:  Individual Therapy:  Family Therapy:  MD Initial:  MD Follow-Up     Check Ins: Therapist continued facilitation of rapport building strategies between group members. Therapist asked that each patient check in with home life and recovery efforts and identify triggers, cravings, and high risk situations that arise between group sessions. Therapist provided empathy and support during group session.     Session Content/Coping Skills: Steve ([facility] ) attended first part of session. Anjana (Behavioral [employer] Student/) attended for session. Individual check ins completed with group members.  facilitated “Deep and Personal” activity (retrieved from taking the [employer] website). Living in Balance- Session 6 completed.  facilitated completion of a higher power box. Group members shared a fun fact about themselves.     Response: Patient attended class in person. Patient participated in completion of check in form. Patient on check in form answered no to question of “currently or since your last group meeting, have you had any suicidal thoughts or plan or intent to hurt yourself”, and patient also answered no on check in form to question of “currently or since your last group meeting, have you had any homicidal thoughts or plan or intent to hurt others”. Patient on check in form reported concerns with sleep since last group meeting.     Personal Assessment 0-10 Scale (0-none, 10-high)    Anxiety:  8   Depression:  7   Cravings: 0     Assessment:     ..  Lab on 04/09/2025   Component Date Value Ref Range Status    THC, Screen,  Urine 04/09/2025 Positive (A)  Negative Final    Phencyclidine (PCP), Urine 04/09/2025 Negative  Negative Final    Cocaine Screen, Urine 04/09/2025 Negative  Negative Final    Methamphetamine, Ur 04/09/2025 Negative  Negative Final    Opiate Screen 04/09/2025 Negative  Negative Final    Amphetamine Screen, Urine 04/09/2025 Negative  Negative Final    Benzodiazepine Screen, Urine 04/09/2025 Positive (A)  Negative Final    Tricyclic Antidepressants Screen 04/09/2025 Negative  Negative Final    Methadone Screen, Urine 04/09/2025 Negative  Negative Final    Barbiturates Screen, Urine 04/09/2025 Negative  Negative Final    Oxycodone Screen, Urine 04/09/2025 Negative  Negative Final    Buprenorphine, Screen, Urine 04/09/2025 Positive (A)  Negative Final    FENTANYL 04/09/2025 Not Detected  2 ng/mL ng/mL Final    NORFENTANYL 04/09/2025 Not Detected  10 ng/mL ng/mL Final    AMPHETAMINE 04/09/2025 Not Detected  250 ng/mL ng/mL Final    PHENTERMINE 04/09/2025 Not Detected  100 ng/mL ng/mL Final    EDDP 04/09/2025 Not Detected  50 ng/mL ng/mL Final    METHADONE 04/09/2025 Not Detected  25 ng/mL ng/mL Final    NOROXYCODONE 04/09/2025 Not Detected  50 ng/mL ng/mL Final    Oxycodone, Confirmation, Urine 04/09/2025 Not Detected  50 ng/mL ng/mL Final    OXYMORPHONE 04/09/2025 Not Detected  50 ng/mL ng/mL Final    PHENCYCLIDINE 04/09/2025 Not Detected  20 ng/mL ng/mL Final    9-DELTA-THC-COOH 04/09/2025 41^NOT DETECTED  100 ng/mL ng/mL Final    Not Detected    6-ACETYLMORPHINE 04/09/2025 Not Detected  10 ng/mL ng/mL Final    AMPHETAMINE 04/09/2025 Not Detected  500 ng/mL ng/mL Final    Barbiturates 04/09/2025 Not Detected  200 ng/mL ng/mL Final    Benzodiazepines 04/09/2025 Detected  200 ng/mL ng/mL Final    BUPRENORPHINE 04/09/2025 Detected  5 ng/mL ng/mL Final    Cocaine Metabolite 04/09/2025 Not Detected  150 ng/mL ng/mL Final    EDDP 04/09/2025 Not Detected  100 ng/mL ng/mL Final    ALCOHOL, ETHYL 04/09/2025 Not Detected   <100.0000 ng/mL Final    FENTANYL 04/09/2025 Not Detected  1 ng/mL ng/mL Final    METHAMPHETAMINE 04/09/2025 Not Detected  500 ng/mL ng/mL Final    Opiates 04/09/2025 Not Detected  300 ng/mL ng/mL Final    OXYCODONE 04/09/2025 Not Detected  100 ng/mL ng/mL Final    PCP 04/09/2025 Not Detected  25 ng/mL ng/mL Final    THC 04/09/2025 Detected  50 ng/mL ng/mL Final    TRICYCLIC ANTIDEPRESSANTS 04/09/2025 Not Detected  300 ng/mL ng/mL Final    Reference Lab Report 04/09/2025    Final    See Full Screen for results.    METHYLPHENIDATE 04/09/2025 Not Detected  10 ng/mL ng/mL Final    6-acetylmorphine 04/09/2025 Not Detected  10 ng/mL ng/mL Final    BUPRENORPHINE 04/09/2025 123^DETECTED  10 ng/mL ng/mL Final    Detected    NORBUPRENORPHINE 04/09/2025 81^DETECTED  10 ng/mL ng/mL Final    Detected    ZOLPIDEM 04/09/2025 Not Detected  2 ng/mL ng/mL Final    7- AMINOCLONAZEPAM 04/09/2025 42^NOT DETECTED  50 ng/mL ng/mL Final    Not Detected    ALPHA-HYDROXYALPRAZOLAM 04/09/2025 471^DETECTED  50 ng/mL ng/mL Final    Detected    Alprazolam 04/09/2025 205^DETECTED  50 ng/mL ng/mL Final    Detected    CLONAZEPAM 04/09/2025 Not Detected  50 ng/mL ng/mL Final    DIAZEPAM 04/09/2025 Not Detected  50 ng/mL ng/mL Final    Flunitrazepam 04/09/2025 Not Detected  50 ng/mL ng/mL Final    FLURAZEPAM 04/09/2025 Not Detected  50 ng/mL ng/mL Final    LORAZEPAM 04/09/2025 Not Detected  100 ng/mL ng/mL Final    Midazolam, Urine 04/09/2025 Not Detected  50 ng/mL ng/mL Final    NORDIAZEPAM 04/09/2025 Not Detected  50 ng/mL ng/mL Final    OXAZEPAM 04/09/2025 Not Detected  50 ng/mL ng/mL Final    TEMAZEPAM 04/09/2025 Not Detected  50 ng/mL ng/mL Final    BENZOYLECGONINE 04/09/2025 Not Detected  100 ng/mL ng/mL Final    GABAPENTIN 04/09/2025 Not Detected  500 ng/mL ng/mL Final    METHAMPHETAMINE 04/09/2025 Not Detected  100 ng/mL ng/mL Final    PREGABALIN 04/09/2025 Not Detected  250 ng/mL ng/mL Final    O-Desmethyltramadol 04/09/2025 Not Detected   100 ng/mL ng/mL Final    Tramadol 04/09/2025 Not Detected  50 ng/mL ng/mL Final   Lab on 03/25/2025   Component Date Value Ref Range Status    THC, Screen, Urine 03/25/2025 Positive (A)  Negative Final    Phencyclidine (PCP), Urine 03/25/2025 Negative  Negative Final    Cocaine Screen, Urine 03/25/2025 Negative  Negative Final    Methamphetamine, Ur 03/25/2025 Negative  Negative Final    Opiate Screen 03/25/2025 Negative  Negative Final    Amphetamine Screen, Urine 03/25/2025 Negative  Negative Final    Benzodiazepine Screen, Urine 03/25/2025 Positive (A)  Negative Final    Tricyclic Antidepressants Screen 03/25/2025 Negative  Negative Final    Methadone Screen, Urine 03/25/2025 Negative  Negative Final    Barbiturates Screen, Urine 03/25/2025 Negative  Negative Final    Oxycodone Screen, Urine 03/25/2025 Negative  Negative Final    Buprenorphine, Screen, Urine 03/25/2025 Positive (A)  Negative Final    METHAMPHETAMINE 03/25/2025 Not Detected  500 ng/mL ng/mL Final    Opiates 03/25/2025 Not Detected  300 ng/mL ng/mL Final    OXYCODONE 03/25/2025 Not Detected  100 ng/mL ng/mL Final    PCP 03/25/2025 Not Detected  25 ng/mL ng/mL Final    THC 03/25/2025 Detected  50 ng/mL ng/mL Final    TRICYCLIC ANTIDEPRESSANTS 03/25/2025 Not Detected  300 ng/mL ng/mL Final    6-ACETYLMORPHINE 03/25/2025 Not Detected  10 ng/mL ng/mL Final    AMPHETAMINE 03/25/2025 Not Detected  500 ng/mL ng/mL Final    Barbiturates 03/25/2025 Not Detected  200 ng/mL ng/mL Final    Benzodiazepines 03/25/2025 Not Detected  200 ng/mL ng/mL Final    BUPRENORPHINE 03/25/2025 Detected  5 ng/mL ng/mL Final    Cocaine Metabolite 03/25/2025 Not Detected  150 ng/mL ng/mL Final    EDDP 03/25/2025 Not Detected  100 ng/mL ng/mL Final    ALCOHOL, ETHYL 03/25/2025 Not Detected  <100.0000 ng/mL Final    FENTANYL 03/25/2025 Not Detected  1 ng/mL ng/mL Final    Reference Lab Report 03/25/2025    Final    See Full Screen for results.    ZOLPIDEM 03/25/2025 Not Detected   2 ng/mL ng/mL Final    O-Desmethyltramadol 03/25/2025 Not Detected  100 ng/mL ng/mL Final    Tramadol 03/25/2025 Not Detected  50 ng/mL ng/mL Final    9-DELTA-THC-COOH 03/25/2025 66^NOT DETECTED  100 ng/mL ng/mL Final    Not Detected    6-acetylmorphine 03/25/2025 Not Detected  10 ng/mL ng/mL Final    PHENCYCLIDINE 03/25/2025 Not Detected  20 ng/mL ng/mL Final    PREGABALIN 03/25/2025 Not Detected  250 ng/mL ng/mL Final    NOROXYCODONE 03/25/2025 Not Detected  50 ng/mL ng/mL Final    Oxycodone, Confirmation, Urine 03/25/2025 Not Detected  50 ng/mL ng/mL Final    OXYMORPHONE 03/25/2025 Not Detected  50 ng/mL ng/mL Final    METHAMPHETAMINE 03/25/2025 Not Detected  100 ng/mL ng/mL Final    EDDP 03/25/2025 Not Detected  50 ng/mL ng/mL Final    METHADONE 03/25/2025 Not Detected  25 ng/mL ng/mL Final    GABAPENTIN 03/25/2025 Not Detected  500 ng/mL ng/mL Final    BUPRENORPHINE 03/25/2025 34^DETECTED  10 ng/mL ng/mL Final    Detected    NORBUPRENORPHINE 03/25/2025 32^DETECTED  10 ng/mL ng/mL Final    Detected    BENZOYLECGONINE 03/25/2025 Not Detected  100 ng/mL ng/mL Final    AMPHETAMINE 03/25/2025 Not Detected  250 ng/mL ng/mL Final    PHENTERMINE 03/25/2025 Not Detected  100 ng/mL ng/mL Final    FENTANYL 03/25/2025 Not Detected  2 ng/mL ng/mL Final    NORFENTANYL 03/25/2025 Not Detected  10 ng/mL ng/mL Final    METHYLPHENIDATE 03/25/2025 Not Detected  10 ng/mL ng/mL Final       Mental Status Exam  Hygiene:  good  Dress: casual  Attitude: cooperative and agreeable   Motor Activity: appropriate  Eye Contact:  good  Speech: regular rate and rhythm   Mood:  calm and cooperative  Affect:  Appropriate  Thought Processes:  Linear  Thought Content:  Normal  Suicidal Thoughts:  denies  Homicidal Thoughts:  denies  Crisis Safety Plan: Safety plan has been discussed.   Hallucinations:  Unknown to clinician.   Reliability: fair  Insight: fair  Judgement: fair  Impulse Control: fair    Recovery/spiritual support group attendance:  No.      Progress toward goal: Not at goal    Prognosis: Fair with Ongoing Treatment     Self-reported number of days sober: Patient on check in form reported Ha, 13, 24.    Patient will contact this office, call 911 or present to the nearest emergency room should suicidal or homicidal ideations occur.    Impression/Formulation:    ICD-10-CM ICD-9-CM   1. Opioid use disorder, severe, on maintenance therapy  F11.20 304.00   2. Methamphetamine use disorder, severe, in early remission  F15.21 304.43   3. Sedative, hypnotic or anxiolytic use disorder, severe, dependence  F13.20 304.10   4. Moderate cannabis use disorder  F12.20 304.30       Clinical Maneuvering/Interventions: Therapist utilized a person-centered approach to build rapport with group member. Therapist implemented motivational interviewing techniques to assist client with exploring and resolving ambivalence associated with commitment to change behaviors related to substance use and addiction. Therapist applied cognitive behavioral strategies to facilitate identification of maladaptive patterns of thinking and behavior that contribute to client's risk for continued substance use and relapse. Therapist employed group interaction activities to build rapport among group members, promote sobriety, and emphasize relapse prevention. Therapist promoted safe nonjudgmental environment by providing group members with unconditional positive regard and encouraging group members to comply with group rules and guidelines. Therapist assisted group member with identifying and implementing healthier coping strategies.      Plan:  Continue [facility] IOP Phase I   Aftercare:  [facility] Phase II  Program Assignments:  Personal recovery plan, relapse prevention plan, attendance of recovery support group meetings, exploration of sponsorship, drug/alcohol screens.     Andre Zuñiga LCSW  4/17/2025      Part of this note may  be an electronic transcription/translation of spoken language to printed text using the Dragon Dictation System.

## 2025-04-21 ENCOUNTER — OFFICE VISIT (OUTPATIENT)
Dept: PSYCHIATRY | Facility: HOSPITAL | Age: 40
End: 2025-04-21
Payer: MEDICAID

## 2025-04-21 DIAGNOSIS — F15.21 METHAMPHETAMINE USE DISORDER, SEVERE, IN EARLY REMISSION: ICD-10-CM

## 2025-04-21 DIAGNOSIS — F11.20 OPIOID USE DISORDER, SEVERE, ON MAINTENANCE THERAPY: Primary | ICD-10-CM

## 2025-04-21 PROCEDURE — H0015 ALCOHOL AND/OR DRUG SERVICES: HCPCS

## 2025-04-23 ENCOUNTER — OFFICE VISIT (OUTPATIENT)
Dept: PSYCHIATRY | Facility: HOSPITAL | Age: 40
End: 2025-04-23
Payer: MEDICAID

## 2025-04-23 ENCOUNTER — LAB (OUTPATIENT)
Dept: LAB | Facility: HOSPITAL | Age: 40
End: 2025-04-23
Payer: MEDICAID

## 2025-04-23 DIAGNOSIS — F11.20 OPIOID USE DISORDER, SEVERE, ON MAINTENANCE THERAPY: ICD-10-CM

## 2025-04-23 DIAGNOSIS — F12.20 CANNABIS USE DISORDER, MODERATE, DEPENDENCE: ICD-10-CM

## 2025-04-23 DIAGNOSIS — F13.20 SEDATIVE, HYPNOTIC OR ANXIOLYTIC USE DISORDER, SEVERE, DEPENDENCE: ICD-10-CM

## 2025-04-23 DIAGNOSIS — F11.20 OPIOID USE DISORDER, SEVERE, ON MAINTENANCE THERAPY: Primary | ICD-10-CM

## 2025-04-23 DIAGNOSIS — F15.21 METHAMPHETAMINE USE DISORDER, SEVERE, IN EARLY REMISSION: ICD-10-CM

## 2025-04-23 PROCEDURE — H0015 ALCOHOL AND/OR DRUG SERVICES: HCPCS

## 2025-04-23 PROCEDURE — 80306 DRUG TEST PRSMV INSTRMNT: CPT

## 2025-04-24 ENCOUNTER — OFFICE VISIT (OUTPATIENT)
Dept: PSYCHIATRY | Facility: HOSPITAL | Age: 40
End: 2025-04-24
Payer: MEDICAID

## 2025-04-24 DIAGNOSIS — F15.21 METHAMPHETAMINE USE DISORDER, SEVERE, IN EARLY REMISSION: ICD-10-CM

## 2025-04-24 DIAGNOSIS — F11.20 OPIOID USE DISORDER, SEVERE, ON MAINTENANCE THERAPY: Primary | ICD-10-CM

## 2025-04-24 PROCEDURE — H0015 ALCOHOL AND/OR DRUG SERVICES: HCPCS

## 2025-04-24 NOTE — PROGRESS NOTES
NAME: Ema Phan  DATE: 04/21/2025    Phase I  6;00PM - 9:00PM    IOP GROUP NOTE    DATA:     3 hour IOP group therapy session (Check-ins, Positive Cognitive Self Talk Activity, and Problem Solving Activity)      Check Ins:  Therapist continued facilitation of rapport building strategies between group members. Therapist asked that each patient check in with home life and recovery efforts and identify triggers, cravings, and high risk situations that arise between group sessions.  Group members discussed barriers and benefits of attending recovery meetings.  Therapist provided empathy and support during group session.      Session Content/Coping Skills:  LEIGHA Caldera, DERIK Yeung, Nisha, , attended the session. Individual check ins completed by group members.  reviewed Positive Attitudes and completed a group activity of Positive Self-Assessment.  The clinician overviewed [facility] Problem Solving psychoeducational material reviewed and discussed (retrieved from therapist aid). Psychoeducational material of identifying red flags for decision making- warning signs of productive living.     Response:  Patient attended class in person. Patient participated in completion of check in form. Patient on check in form answered no to question of “currently or since your last group meeting, have you had any suicidal thoughts or plan or intent to hurt yourself”, and patient also answered no on check in form to question of “currently or since your last group meeting, have you had any homicidal thoughts or plan or intent to hurt others”.  Patient contributed to the group discussion by sharing how she is still working on problem solving skills.       Anxiety:  7/10  Depression:  6/10  Cravings:  0/10      ASSESSMENT:     Lab Review  4/14/25 Lab results not detected    Mental Status Exam  Hygiene:  fair  Dress: casual  Attitude: cooperative and agreeable   Motor Activity:  appropriate  Eye Contact:  good  Speech: regular rate and rhythm   Mood:  calm and cooperative  Affect:  Appropriate  Thought Processes:  Goal directed  Thought Content:  Normal  Suicidal Thoughts:  denies  Homicidal Thoughts:  denies  Crisis Safety Plan: yes, to come to the emergency room.  Reliability: fair  Insight: fair  Judgement: fair  Impulse Control: fair    Progress toward goal: At goal     Prognosis: Fair with Ongoing Treatment     Recovery/spiritual support group attendance: 2      Motivation for treatment:  9  Self-reported number of days sober:  7 days    Impression/Formulation:     Diagnosis Plan   1. Opioid use disorder, severe, on maintenance therapy        2. Methamphetamine use disorder, severe, in early remission            CLINICAL MANUVERING/INTERVENTIONS:  Therapist utilized a person-centered approach to build rapport with group member.  Therapist implemented motivational interviewing techniques to assist client with exploring and resolving ambivalence associated with commitment to change behaviors related to substance use and addiction.  Therapist applied cognitive behavioral strategies to facilitate identification of maladaptive patterns of thinking and behavior that contribute to cleint's risk for continued substance use and relapse.  Therapist employed group interaction activities to build rapport among group members, promote sobriety, and emphasize relapse prevention.  Therapist promoted safe nonjudgmental environment by providing group members with unconditional positive regard and encouraging group members to comply with group rules and guidelines.  Therapist utilized dialectical behavior techniques to teach and model emotional regulation and relaxation.  Therapist assisted group member with identifying and implementing healthier coping strategies.  Group member was encouraged to attend community support group meetings, make positive daily choices, and maintain healthy boundaries.  Group  member was encouraged to invite family members to family educational group on Wednesdays.      PLAN:  Continue [facility] IOP Phase I   Aftercare:  [facility] Phase II  Program Assignments:  Personal recovery plan, relapse prevention plan, attendance of recovery support group meetings, exploration of sponsorship, drug/alcohol screens.      Please note that portions of this note were completed with a voice recognition program. Efforts were made to edit dictation, but occasionally words are mistranscribed.      This document signed by Edel Schumacher, April 24, 2025, 15:30 EDT

## 2025-04-25 LAB — REF LAB TEST METHOD: NORMAL

## 2025-04-26 NOTE — PROGRESS NOTES
NAME: Ema Phan  DATE: 04/23/2025    Phase I  6:00PM - 9:00PM    IOP GROUP NOTE    DATA:  3 hour IOP group therapy session (Check-ins, Core Beliefs Activity, and Addiction and the Brain)      Check Ins:  Therapist continued facilitation of rapport building strategies between group members. Therapist asked that each patient check in with home life and recovery efforts and identify triggers, cravings, and high risk situations that arise between group sessions.  Group members discussed barriers and benefits of attending recovery meetings.  Therapist provided empathy and support during group session.     Session Content/Coping Skills:  LEIGHA Caldera, DERIK Yeung Chloe, , attended the session. Individual check ins completed by group members. The  facilitated a discussion on core beliefs and led an activity exploring how thoughts influence mood and behavior. Additionally, the clinician provided an overview of the psychoeducational content on addiction and the brain, including a review and discussion of the material titled This Is Your Brain on Drugs.     Response:  Patient attended class in person. Patient participated in completion of check in form. Patient on check in form answered no to question of “currently or since your last group meeting, have you had any suicidal thoughts or plan or intent to hurt yourself”, and patient also answered no on check in form to question of “currently or since your last group meeting, have you had any homicidal thoughts or plan or intent to hurt others”.       Anxiety:  8/10  Depression:  6/10  Cravings:  0/10      ASSESSMENT:     Lab Review  4/23/25 Lab Results Positive THC, Benzodiazepine, Buprenorphine    Mental Status Exam  Hygiene:  good  Dress: casual  Attitude: cooperative and agreeable   Motor Activity: appropriate  Eye Contact:  good  Speech: regular rate and rhythm   Mood:  calm and cooperative  Affect:  Appropriate  Thought  Processes:  Goal directed  Thought Content:  Normal  Suicidal Thoughts:  denies  Homicidal Thoughts:  denies  Crisis Safety Plan: yes, to come to the emergency room.  Reliability: fair  Insight: fair  Judgement: fair  Impulse Control: fair    Progress toward goal: At goal    Prognosis: Fair with Ongoing Treatment     Recovery/spiritual support group attendance: 2    Motivation for treatment:  9    Self-reported number of days sober:  June 13, 2024    Impression/Formulation:     Diagnosis Plan   1. Opioid use disorder, severe, on maintenance therapy        2. Methamphetamine use disorder, severe, in early remission            CLINICAL MANUVERING/INTERVENTIONS:  Therapist utilized a person-centered approach to build rapport with group member.  Therapist implemented motivational interviewing techniques to assist client with exploring and resolving ambivalence associated with commitment to change behaviors related to substance use and addiction.  Therapist applied cognitive behavioral strategies to facilitate identification of maladaptive patterns of thinking and behavior that contribute to cleint's risk for continued substance use and relapse.  Therapist employed group interaction activities to build rapport among group members, promote sobriety, and emphasize relapse prevention.  Therapist promoted safe nonjudgmental environment by providing group members with unconditional positive regard and encouraging group members to comply with group rules and guidelines.  Therapist utilized dialectical behavior techniques to teach and model emotional regulation and relaxation.  Therapist assisted group member with identifying and implementing healthier coping strategies.  Group member was encouraged to attend community support group meetings, make positive daily choices, and maintain healthy boundaries.  Group member was encouraged to invite family members to family educational group on Wednesdays.      PLAN:  Continue [facility] IOP Phase I   Aftercare:  [facility] Phase II  Program Assignments:  Personal recovery plan, relapse prevention plan, attendance of recovery support group meetings, exploration of sponsorship, drug/alcohol screens.      Please note that portions of this note were completed with a voice recognition program. Efforts were made to edit dictation, but occasionally words are mistranscribed.      This document signed by Edel Schumacher, April 25, 2025, 21:49 EDT

## 2025-04-26 NOTE — PROGRESS NOTES
NAME: Ema Phan  DATE: 04/24/2025    Phase I  6:00PM - 9:00PM    IOP GROUP NOTE     DATA:     3 hour IOP group therapy session  (Check-ins, Core Beliefs Activity, and Addiction and the Brain)     Check Ins:  Therapist continued facilitation of rapport building strategies between group members. Therapist asked that each patient check in with home life and recovery efforts and identify triggers, cravings, and high risk situations that arise between group sessions.  Group members discussed barriers and benefits of attending recovery meetings.  Therapist provided empathy and support during group session.    Session Content/Coping Skills:  LEIGHA Caldera, DERIK Yeung Chloe, , attended the session. Individual check ins completed by group members. The  facilitated a discussion on core beliefs and led an activity exploring how thoughts influence mood and behavior. Additionally, the clinician provided an overview of the psychoeducational content on addiction and the brain, including a review and discussion of the material titled This Is Your Brain on Drugs.       Response:  Patient attended class in person. Patient participated in completion of check in form. Patient on check in form answered no to question of “currently or since your last group meeting, have you had any suicidal thoughts or plan or intent to hurt yourself”, and patient also answered no on check in form to question of “currently or since your last group meeting, have you had any homicidal thoughts or plan or intent to hurt others”.       Anxiety:  5/10  Depression:  4/10  Cravings:  0/10      ASSESSMENT:     Lab Review  4/23/25 Lab Result positive for buprenorphine, benzodiazepine, and thc.     Mental Status Exam  Hygiene:  good  Dress: casual  Attitude: cooperative and agreeable   Motor Activity: appropriate  Eye Contact:  good  Speech: regular rate and rhythm   Mood:  calm and cooperative  Affect:   Appropriate  Thought Processes:  Goal directed  Thought Content:  Normal  Suicidal Thoughts:  denies  Homicidal Thoughts:  denies  Crisis Safety Plan: yes, to come to the emergency room.  Reliability: fair  Insight: fair  Judgement: fair  Impulse Control: fair    Progress toward goal: At goal     Prognosis: Fair with Ongoing Treatment     Recovery/spiritual support group attendance: no      Motivation for treatment:  8  Self-reported number of days sober:  July 13 2024    Impression/Formulation:     Diagnosis Plan   1. Opioid use disorder, severe, on maintenance therapy        2. Methamphetamine use disorder, severe, in early remission            CLINICAL MANUVERING/INTERVENTIONS:  Therapist utilized a person-centered approach to build rapport with group member.  Therapist implemented motivational interviewing techniques to assist client with exploring and resolving ambivalence associated with commitment to change behaviors related to substance use and addiction.  Therapist applied cognitive behavioral strategies to facilitate identification of maladaptive patterns of thinking and behavior that contribute to cleint's risk for continued substance use and relapse.  Therapist employed group interaction activities to build rapport among group members, promote sobriety, and emphasize relapse prevention.  Therapist promoted safe nonjudgmental environment by providing group members with unconditional positive regard and encouraging group members to comply with group rules and guidelines.  Therapist utilized dialectical behavior techniques to teach and model emotional regulation and relaxation.  Therapist assisted group member with identifying and implementing healthier coping strategies.  Group member was encouraged to attend community support group meetings, make positive daily choices, and maintain healthy boundaries.  Group member was encouraged to invite family members to family educational group on  Wednesdays.      PLAN:  Continue [facility] IOP Phase I   Aftercare:  [facility] Phase II  Program Assignments:  Personal recovery plan, relapse prevention plan, attendance of recovery support group meetings, exploration of sponsorship, drug/alcohol screens.      Please note that portions of this note were completed with a voice recognition program. Efforts were made to edit dictation, but occasionally words are mistranscribed.      This document signed by Edel Schumacher, April 25, 2025, 23:14 EDT

## 2025-04-28 ENCOUNTER — OFFICE VISIT (OUTPATIENT)
Dept: PSYCHIATRY | Facility: HOSPITAL | Age: 40
End: 2025-04-28
Payer: MEDICAID

## 2025-04-28 ENCOUNTER — LAB (OUTPATIENT)
Dept: LAB | Facility: HOSPITAL | Age: 40
End: 2025-04-28
Payer: MEDICAID

## 2025-04-28 DIAGNOSIS — F15.21 METHAMPHETAMINE USE DISORDER, SEVERE, IN EARLY REMISSION: ICD-10-CM

## 2025-04-28 DIAGNOSIS — F12.20 CANNABIS USE DISORDER, MODERATE, DEPENDENCE: ICD-10-CM

## 2025-04-28 DIAGNOSIS — F13.20 SEDATIVE, HYPNOTIC OR ANXIOLYTIC USE DISORDER, SEVERE, DEPENDENCE: ICD-10-CM

## 2025-04-28 DIAGNOSIS — F11.20 OPIOID USE DISORDER, SEVERE, ON MAINTENANCE THERAPY: ICD-10-CM

## 2025-04-28 DIAGNOSIS — F12.20 MODERATE CANNABIS USE DISORDER: ICD-10-CM

## 2025-04-28 DIAGNOSIS — F11.20 OPIOID USE DISORDER, SEVERE, ON MAINTENANCE THERAPY: Primary | ICD-10-CM

## 2025-04-28 LAB
AMPHET+METHAMPHET UR QL: NEGATIVE
AMPHETAMINES UR QL: NEGATIVE
BARBITURATES UR QL SCN: NEGATIVE
BENZODIAZ UR QL SCN: NEGATIVE
BUPRENORPHINE SERPL-MCNC: NEGATIVE NG/ML
CANNABINOIDS SERPL QL: NEGATIVE
COCAINE UR QL: NEGATIVE
METHADONE UR QL SCN: NEGATIVE
OPIATES UR QL: NEGATIVE
OXYCODONE UR QL SCN: NEGATIVE
PCP UR QL SCN: NEGATIVE
REF LAB TEST METHOD: NORMAL
TRICYCLICS UR QL SCN: NEGATIVE

## 2025-04-28 PROCEDURE — 80306 DRUG TEST PRSMV INSTRMNT: CPT

## 2025-04-28 PROCEDURE — H0015 ALCOHOL AND/OR DRUG SERVICES: HCPCS | Performed by: SOCIAL WORKER

## 2025-04-29 PROBLEM — F13.20 SEDATIVE, HYPNOTIC OR ANXIOLYTIC USE DISORDER, SEVERE, DEPENDENCE: Status: ACTIVE | Noted: 2025-04-29

## 2025-04-29 PROBLEM — F15.21 METHAMPHETAMINE USE DISORDER, SEVERE, IN EARLY REMISSION: Status: ACTIVE | Noted: 2025-04-29

## 2025-04-29 PROBLEM — F11.20 OPIOID USE DISORDER, SEVERE, ON MAINTENANCE THERAPY: Status: ACTIVE | Noted: 2025-04-29

## 2025-04-30 ENCOUNTER — DOCUMENTATION (OUTPATIENT)
Dept: PSYCHIATRY | Facility: CLINIC | Age: 40
End: 2025-04-30
Payer: MEDICAID

## 2025-04-30 LAB — REF LAB TEST METHOD: NORMAL

## 2025-04-30 NOTE — PROGRESS NOTES
Behavioral Health Note 4/30/2025:  Steve Yeung, and Clinician this date called to speak with patient regarding medication regimen and preliminary UDS completed on 4/28/2025. As is documented in previous notes in chart, patient was advised by WARREN Giron before the start of CD-IOP that patient would need to come off of Xanax. During class on 4/28/2025, patient reported recent Xanax use. Clinician on this phone call explained that per WARREN advice, she would need to come off Xanax. Clinician also asked about why suboxone was not in prelim on UDS. Patient stated that she takes her Suboxone as needed. Patient inquired about getting on a shot, clinician explained that he would check on this and have someone reach out to patient. Patient stated that she would not be in class tonight due to a medical situation of a family member.    -Andre Zuñiga LCSW.   4/30/2025.

## 2025-05-01 ENCOUNTER — OFFICE VISIT (OUTPATIENT)
Dept: PSYCHIATRY | Facility: HOSPITAL | Age: 40
End: 2025-05-01
Payer: MEDICAID

## 2025-05-01 DIAGNOSIS — F15.21 METHAMPHETAMINE USE DISORDER, SEVERE, IN EARLY REMISSION: ICD-10-CM

## 2025-05-01 DIAGNOSIS — F13.20 SEDATIVE, HYPNOTIC OR ANXIOLYTIC USE DISORDER, SEVERE, DEPENDENCE: ICD-10-CM

## 2025-05-01 DIAGNOSIS — F11.20 OPIOID USE DISORDER, SEVERE, ON MAINTENANCE THERAPY: Primary | ICD-10-CM

## 2025-05-01 DIAGNOSIS — F12.20 MODERATE CANNABIS USE DISORDER: ICD-10-CM

## 2025-05-01 PROCEDURE — H0015 ALCOHOL AND/OR DRUG SERVICES: HCPCS | Performed by: SOCIAL WORKER

## 2025-05-02 ENCOUNTER — TELEPHONE (OUTPATIENT)
Dept: GASTROENTEROLOGY | Facility: CLINIC | Age: 40
End: 2025-05-02

## 2025-05-02 ENCOUNTER — LAB (OUTPATIENT)
Dept: LAB | Facility: HOSPITAL | Age: 40
End: 2025-05-02
Payer: MEDICAID

## 2025-05-02 ENCOUNTER — SPECIALTY PHARMACY (OUTPATIENT)
Dept: PHARMACY | Facility: HOSPITAL | Age: 40
End: 2025-05-02
Payer: MEDICAID

## 2025-05-02 VITALS
TEMPERATURE: 98.4 F | OXYGEN SATURATION: 96 % | WEIGHT: 148.6 LBS | DIASTOLIC BLOOD PRESSURE: 62 MMHG | BODY MASS INDEX: 29.02 KG/M2 | HEART RATE: 88 BPM | SYSTOLIC BLOOD PRESSURE: 102 MMHG

## 2025-05-02 DIAGNOSIS — B18.2 CHRONIC HEPATITIS C WITHOUT HEPATIC COMA: Primary | ICD-10-CM

## 2025-05-02 DIAGNOSIS — B18.2 CHRONIC HEPATITIS C WITHOUT HEPATIC COMA: ICD-10-CM

## 2025-05-02 DIAGNOSIS — K62.5 RECTAL BLEEDING: ICD-10-CM

## 2025-05-02 DIAGNOSIS — Z12.11 ENCOUNTER FOR COLORECTAL CANCER SCREENING: ICD-10-CM

## 2025-05-02 DIAGNOSIS — R74.8 ELEVATED LIVER ENZYMES: ICD-10-CM

## 2025-05-02 DIAGNOSIS — K76.0 METABOLIC DYSFUNCTION-ASSOCIATED STEATOTIC LIVER DISEASE (MASLD): ICD-10-CM

## 2025-05-02 DIAGNOSIS — Z12.12 ENCOUNTER FOR COLORECTAL CANCER SCREENING: Primary | ICD-10-CM

## 2025-05-02 DIAGNOSIS — Z12.11 ENCOUNTER FOR COLORECTAL CANCER SCREENING: Primary | ICD-10-CM

## 2025-05-02 DIAGNOSIS — Z12.12 ENCOUNTER FOR COLORECTAL CANCER SCREENING: ICD-10-CM

## 2025-05-02 LAB
6-ACETYLMORPHINE: NOT DETECTED NG/ML
6-ACETYLMORPHINE: NOT DETECTED NG/ML
7- AMINOCLONAZEPAM: NOT DETECTED NG/ML
9-DELTA-THC-COOH: NORMAL NG/ML
9-DELTA-THC-COOH: NOT DETECTED NG/ML
ALBUMIN SERPL-MCNC: 3.9 G/DL (ref 3.5–5.2)
ALBUMIN/GLOB SERPL: 1.1 G/DL
ALCOHOL, ETHYL: NOT DETECTED NG/ML
ALCOHOL, ETHYL: NOT DETECTED NG/ML
ALP SERPL-CCNC: 135 U/L (ref 39–117)
ALPHA-HYDROXYALPRAZOLAM: NORMAL NG/ML
ALPRAZ SPEC-MCNC: NORMAL NG/ML
ALT SERPL W P-5'-P-CCNC: 65 U/L (ref 1–33)
AMPHETAMINE: NOT DETECTED NG/ML
AMPHETAMINE: NOT DETECTED NG/ML
ANION GAP SERPL CALCULATED.3IONS-SCNC: 10.1 MMOL/L (ref 5–15)
AST SERPL-CCNC: 85 U/L (ref 1–32)
BENZODIAZ BLD QL: DETECTED NG/ML
BENZODIAZ BLD QL: NOT DETECTED NG/ML
BILIRUB SERPL-MCNC: 0.3 MG/DL (ref 0–1.2)
BUN SERPL-MCNC: 6 MG/DL (ref 6–20)
BUN/CREAT SERPL: 8.7 (ref 7–25)
BUPRENORPHINE SAL CFM-MCNC: DETECTED NG/ML
BUPRENORPHINE SAL CFM-MCNC: NOT DETECTED NG/ML
BUPRENORPHINE: NORMAL NG/ML
BUPRENORPHINE: NOT DETECTED NG/ML
CALCIUM SPEC-SCNC: 9 MG/DL (ref 8.6–10.5)
CHLORIDE SERPL-SCNC: 105 MMOL/L (ref 98–107)
CLONAZEPAM: NOT DETECTED NG/ML
CO2 SERPL-SCNC: 20.9 MMOL/L (ref 22–29)
COCAINE METABOLITE: NOT DETECTED NG/ML
COCAINE METABOLITE: NOT DETECTED NG/ML
CREAT SERPL-MCNC: 0.69 MG/DL (ref 0.57–1)
DEPRECATED RDW RBC AUTO: 43.3 FL (ref 37–54)
DIAZEPAM: NOT DETECTED NG/ML
EDDP SERPL QL: NOT DETECTED NG/ML
EDDP SERPL QL: NOT DETECTED NG/ML
EGFRCR SERPLBLD CKD-EPI 2021: 112 ML/MIN/1.73
ERYTHROCYTE [DISTWIDTH] IN BLOOD BY AUTOMATED COUNT: 13.2 % (ref 12.3–15.4)
FENTANYL-EIA: NOT DETECTED NG/ML
FENTANYL-EIA: NOT DETECTED NG/ML
FLUNITRAZEPAM SERPLBLD-MCNC: NOT DETECTED NG/ML
FLURAZEPAM BLD CFM-MCNC: NOT DETECTED NG/ML
GABAPENTIN: NOT DETECTED NG/ML
GABAPENTIN: NOT DETECTED NG/ML
GLOBULIN UR ELPH-MCNC: 3.4 GM/DL
GLUCOSE SERPL-MCNC: 79 MG/DL (ref 65–99)
HBV SURFACE AB SER RIA-ACNC: NORMAL
HBV SURFACE AG SERPL QL IA: NORMAL
HCT VFR BLD AUTO: 38.7 % (ref 34–46.6)
HGB BLD-MCNC: 12.8 G/DL (ref 12–15.9)
HIV 1+2 AB+HIV1P24 AG SERPLBLD IA.RAPID: NORMAL
HIV 1+2 AB+HIV1P24 AG SERPLBLD IA.RAPID: NORMAL
INR PPP: 0.98 (ref 0.9–1.1)
LORAZEPAM: NOT DETECTED NG/ML
MCH RBC QN AUTO: 29.7 PG (ref 26.6–33)
MCHC RBC AUTO-ENTMCNC: 33.1 G/DL (ref 31.5–35.7)
MCV RBC AUTO: 89.8 FL (ref 79–97)
METHAMPHETAMINE: NOT DETECTED NG/ML
METHAMPHETAMINE: NOT DETECTED NG/ML
METHYLPHENIDATE: NOT DETECTED NG/ML
METHYLPHENIDATE: NOT DETECTED NG/ML
MIDAZOLAM, URINE: NOT DETECTED NG/ML
NORBUPRENORPHINE: NORMAL NG/ML
NORBUPRENORPHINE: NOT DETECTED NG/ML
NORDIAZEPAM: NOT DETECTED NG/ML
O-DESMETHYLTRAMADOL: NOT DETECTED NG/ML
O-DESMETHYLTRAMADOL: NOT DETECTED NG/ML
OPIATES: NOT DETECTED NG/ML
OPIATES: NOT DETECTED NG/ML
OXAZEPAM: NOT DETECTED NG/ML
OXYCODONE: NOT DETECTED NG/ML
OXYCODONE: NOT DETECTED NG/ML
PCP: NOT DETECTED NG/ML
PCP: NOT DETECTED NG/ML
PLATELET # BLD AUTO: 116 10*3/MM3 (ref 140–450)
PMV BLD AUTO: 11.9 FL (ref 6–12)
POTASSIUM SERPL-SCNC: 3.8 MMOL/L (ref 3.5–5.2)
PREGABALIN: NOT DETECTED NG/ML
PREGABALIN: NOT DETECTED NG/ML
PROT SERPL-MCNC: 7.3 G/DL (ref 6–8.5)
PROTHROMBIN TIME: 13.7 SECONDS (ref 12.3–15.1)
RBC # BLD AUTO: 4.31 10*6/MM3 (ref 3.77–5.28)
SODIUM SERPL-SCNC: 136 MMOL/L (ref 136–145)
TEMAZEPAM: NOT DETECTED NG/ML
THC: DETECTED NG/ML
THC: NOT DETECTED NG/ML
TRAMADOL UR CFM-MCNC: NOT DETECTED NG/ML
TRAMADOL UR CFM-MCNC: NOT DETECTED NG/ML
TRICYCLIC ANTIDEPRESSANTS: NOT DETECTED NG/ML
TRICYCLIC ANTIDEPRESSANTS: NOT DETECTED NG/ML
WBC NRBC COR # BLD AUTO: 6.83 10*3/MM3 (ref 3.4–10.8)
ZOLPIDEM: NOT DETECTED NG/ML
ZOLPIDEM: NOT DETECTED NG/ML

## 2025-05-02 PROCEDURE — 86708 HEPATITIS A ANTIBODY: CPT

## 2025-05-02 PROCEDURE — 85610 PROTHROMBIN TIME: CPT

## 2025-05-02 PROCEDURE — 80053 COMPREHEN METABOLIC PANEL: CPT

## 2025-05-02 PROCEDURE — G0463 HOSPITAL OUTPT CLINIC VISIT: HCPCS

## 2025-05-02 PROCEDURE — 36415 COLL VENOUS BLD VENIPUNCTURE: CPT

## 2025-05-02 PROCEDURE — 87340 HEPATITIS B SURFACE AG IA: CPT

## 2025-05-02 PROCEDURE — 86704 HEP B CORE ANTIBODY TOTAL: CPT

## 2025-05-02 PROCEDURE — 86706 HEP B SURFACE ANTIBODY: CPT

## 2025-05-02 PROCEDURE — 85027 COMPLETE CBC AUTOMATED: CPT

## 2025-05-02 PROCEDURE — G0475 HIV COMBINATION ASSAY: HCPCS

## 2025-05-02 PROCEDURE — 87522 HEPATITIS C REVRS TRNSCRPJ: CPT

## 2025-05-02 PROCEDURE — 87902 NFCT AGT GNTYP ALYS HEP C: CPT

## 2025-05-02 RX ORDER — SODIUM CHLORIDE 9 MG/ML
30 INJECTION, SOLUTION INTRAVENOUS CONTINUOUS PRN
OUTPATIENT
Start: 2025-05-02 | End: 2025-05-03

## 2025-05-02 RX ORDER — PROMETHAZINE HYDROCHLORIDE 25 MG/1
25 TABLET ORAL EVERY 8 HOURS PRN
COMMUNITY
Start: 2025-04-16

## 2025-05-02 RX ORDER — PRAZOSIN HYDROCHLORIDE 2 MG/1
2 CAPSULE ORAL NIGHTLY
COMMUNITY
Start: 2025-04-21

## 2025-05-02 RX ORDER — BISACODYL 5 MG
TABLET, DELAYED RELEASE (ENTERIC COATED) ORAL
Qty: 4 TABLET | Refills: 0 | Status: SHIPPED | OUTPATIENT
Start: 2025-05-02

## 2025-05-02 RX ORDER — POLYETHYLENE GLYCOL 3350 17 G/17G
17 POWDER, FOR SOLUTION ORAL DAILY
Qty: 510 G | Refills: 5 | Status: SHIPPED | OUTPATIENT
Start: 2025-05-02

## 2025-05-02 RX ORDER — HYDROCORTISONE 25 MG/G
CREAM TOPICAL 2 TIMES DAILY
Qty: 28 G | Refills: 1 | Status: SHIPPED | OUTPATIENT
Start: 2025-05-02

## 2025-05-02 NOTE — PROGRESS NOTES
New Patient Consult      Date: 2025   Patient Name: Ema Phan  MRN: [id number]  : 1985     Primary Care Provider: Michaela Vazquez APRN  Referring Provider: Shayna    Chief Complaint   Patient presents with   • Hepatitis C     Pt here for initial Hep C eval     History of Present Illness: Ema Phan is a 39 y.o. female who is here today as a consultation with Gastroenterology for evaluation of Hepatitis C.    She found out about having Hepatitis C infection approx 3 years ago. She has not had prior treatment for hepatitis. Does have history of fatty liver. She thinks her father had cirrhosis. She admits to previous IVDU and intranasal drug use. Has been clean ginger for over 1 year (2024), now following with [facility]. She does have nonprofessional tattoos. Denies having previous alcoholism. She does not currently drink alcohol. She denies  current illicit drug use including marijuana. No recent liver imaging, but has in the past. She has not had recent labs. She has not had previous vaccinations for Hepatitis A and B. She is currently , not working. Has transportation for [employer]. She has a dog named Mery ([place]).     She has not had previous colonoscopy. Has been having rectal bleeding with each BM for approx 1 year now. Not using any hemorrhoid creams. She tried doing a colonoscopy in the past but was not able to drink the prep. There is no known family history of colon cancer or colon polyps.    Subjective      Past Medical History:   Diagnosis Date   • Addiction    • Anxiety    • Depression    • Hepatitis C    • Migraine      Past Surgical History:   Procedure Laterality Date   •  SECTION      x 3   • FOREARM FRACTURE SURGERY Right    • TUBAL ABDOMINAL LIGATION       Family History   Problem Relation Age of Onset   • No Known Problems Mother    • No Known Problems Father    • Colon cancer Neg Hx    • Colon polyps Neg Hx      Social History      Socioeconomic History   • Marital status:    Tobacco Use   • Smoking status: Former     Current packs/day: 0.00     Types: Cigarettes     Quit date: 9/1/2021     Years since quitting: 3.6     Passive exposure: Past   • Smokeless tobacco: Never   Vaping Use   • Vaping status: Every Day   • Substances: Nicotine, Flavoring   • Devices: Disposable   • Passive vaping exposure: Yes   Substance and Sexual Activity   • Alcohol use: No   • Drug use: Not Currently     Types: Methamphetamines, Heroin, Marijuana     Comment: sober for three years   • Sexual activity: Defer     Current Outpatient Medications:   •  buprenorphine-naloxone (SUBOXONE) 8-2 MG per SL tablet, 2 tablets Daily., Disp: , Rfl:   •  busPIRone (BUSPAR) 15 MG tablet, Take 1 tablet by mouth 3 (Three) Times a Day., Disp: , Rfl:   •  FLUoxetine (PROzac) 20 MG capsule, Take 1 capsule by mouth Daily., Disp: , Rfl:   •  hydrOXYzine pamoate (VISTARIL) 25 MG capsule, Take 1 capsule by mouth 3 times a day., Disp: , Rfl:   •  prazosin (MINIPRESS) 2 MG capsule, Take 1 capsule by mouth Every Night., Disp: , Rfl:   •  promethazine (PHENERGAN) 25 MG tablet, Take 1 tablet by mouth Every 8 (Eight) Hours As Needed for Vomiting or Nausea., Disp: , Rfl:   •  tiZANidine (ZANAFLEX) 4 MG tablet, Take 1 tablet by mouth Every Night., Disp: , Rfl:   •  QUEtiapine (SEROquel) 100 MG tablet, Take 1 tablet by mouth every night at bedtime. (Patient not taking: Reported on 5/2/2025), Disp: 30 tablet, Rfl: 0    No Known Allergies    The following portions of the patient's history were reviewed and updated as appropriate: allergies, current medications, past family history, past medical history, past social history, past surgical history and problem list.    Objective     Physical Exam  Constitutional:       General: She is not in acute distress.     Appearance: Normal appearance. She is well-developed. She is not diaphoretic.   HENT:      Head: Normocephalic and atraumatic.       Right Ear: External ear normal.      Left Ear: External ear normal.      Nose: Nose normal.   Eyes:      General: No scleral icterus.        Right eye: No discharge.         Left eye: No discharge.      Conjunctiva/sclera: Conjunctivae normal.   Neck:      Trachea: No tracheal deviation.   Pulmonary:      Effort: Pulmonary effort is normal. No respiratory distress.   Musculoskeletal:         General: Normal range of motion.      Cervical back: Normal range of motion.   Skin:     Coloration: Skin is not pale.      Findings: No erythema or rash.   Neurological:      Mental Status: She is alert and oriented to person, place, and time.      Coordination: Coordination normal.   Psychiatric:         Mood and Affect: Mood normal.         Behavior: Behavior normal.         Thought Content: Thought content normal.         Judgment: Judgment normal.         Vitals:    05/02/25 1207   BP: 102/62   BP Location: Left arm   Patient Position: Sitting   Cuff Size: Adult   Pulse: 88   Temp: 98.4 °F (36.9 °C)   SpO2: 96%   Weight: 67.4 kg (148 lb 9.6 oz)     Results Review:   I have reviewed the patient's new clinical and imaging results.    COMPREHENSIVE METABOLIC PANEL (10/10/2024 21:48)  CBC AND DIFFERENTIAL (10/10/2024 21:48)  Hepatitis C Antibody - ED (09/03/2024 16:43)  HCV RNA BY PCR, QN RFX DENISA (09/03/2024 16:43)  ED HIV 1/2 Antibody/Antigen Screen w/Reflex to HIV 1/2 Differentiation (09/03/2024 16:43)    CTAP 6/2019  Review of lung windows demonstrates very mild bibasilar atelectasis.  Cholecystectomy clips are present. Liver is homogeneous, scattered  calcified granulomas are present in the spleen. The stomach is  decompressed. The pancreas and adrenal glands appear grossly normal on  this unenhanced study. No nephrolithiasis is identified. There are  extrarenal pelves of both kidneys, normal variant. The ureters are  decompressed. The bladder appears within normal limits. Bowel loops are  normal caliber and appear  unremarkable. No free fluid or free air is  seen. There is evidence of previous bilateral tubal ligation. Mild  enlargement of the left ovary with scattered follicles. There is no  evidence of appendicitis. Review of bone windows is unremarkable.  IMPRESSION:  No acute intra-abdominal or pelvic abnormality.    Assessment / Plan      1. Chronic hepatitis C without hepatic coma  2. Elevated liver enzymes  3. Metabolic dysfunction-associated steatotic liver disease (MASLD)  She has chronic Hepatitis C infection, genotype unknown, treatment naive, without suspected cirrhosis. She will have labs today for further evaluation of chronic hep C infection as well as for consideration for treatment. Labs 9/2024 HCV RNA quant 197,838, elevated liver enzymes with , ALT 97, alk phos 159, Tbili 0.8. Hep B core IgM positive 3/2020. Had US liver in 2018 with elastography showed fatty liver with estimated F1 liver fibrosis.     More recommendations will be made after these results have been reviewed. She will continue to abstain from alcohol and illegal drugs. Immunity to Hep A and B will be determined and vaccinations recommended if needed. After review of these results and discussion with with the patient, we will proceed with Hep C therapy and will submit Rx to insurance company for approval. Treatment will depend on fibrosis score and Hep C genotype. When approved, she will  Rx from our pharmacy and start taking exactly as directed. Hep C viral load lab (HCV RNA quant) and CMP will be checked 4 weeks after start of therapy, at end of therapy and 3 months s/p therapy to determine response to treatment and cure. She will call with concerns.      - CBC (No Diff); Future  - Comprehensive Metabolic Panel; Future  - HCV FibroSURE; Future  - HCV RNA By PCR, Qn Rfx Merly; Future  - Hepatitis A Antibody, Total; Future  - Hepatitis B Core Antibody, Total; Future  - Hepatitis B Surface Antigen; Future  - Hepatitis B Surface  Antibody; Future  - HIV-1 / O / 2 Ag / Antibody; Future  - Protime-INR; Future  - Hepatitis B DNA, Quantitative, PCR; Future    4. Rectal bleeding  5. Encounter for colorectal cancer screening  Has been having rectal bleeding with all stools for the past 1 year. No abdominal pain. No constipation or diarrhea reported. No prior colonoscopy. No recent CBC on file. Suspect may have rectal outlet bleeding, others to rule out.     High fiber diet  Anusol BID PRN to start  Colonoscopy to be arranged in the GI clinic    - Hydrocortisone, Perianal, (Anusol-HC) 2.5 % rectal cream; Insert  into the rectum 2 (Two) Times a Day.  Dispense: 28 g; Refill: 1    She will have a colonoscopy performed with monitored anesthesia care. The indications, technique, alternatives and potential risk and complications were discussed with the patient including but not limited to bleeding, bowel perforations, missing lesions and anesthetic complications. The patient understands and wishes to proceed with the procedure and has given their verbal consent. Written patient education information was given to the patient. She should follow up in the office after this procedure to discuss the results and further recommendations can be made at that time. The patient will call if they have further questions before procedure.  - Case Request  - polyethylene glycol (MIRALAX) 17 GM/SCOOP powder; Take 17 g by mouth Daily. Mix with 8 oz liquid  Dispense: 510 g; Refill: 5  - bisacodyl (Dulcolax) 5 MG EC tablet; Follow instructions given at office  Dispense: 4 tablet; Refill: 0    Follow Up:   Return in about 6 weeks (around 6/13/2025) for recheck Hepatitis C.    Kay Santos PA-C  [facility]  5/2/2025  15:23 EDT

## 2025-05-02 NOTE — TELEPHONE ENCOUNTER
Pt was seen today in [facility]. Needs colonoscopy for screening arranged. I have placed CR and sent miralax/dulcolax prep. Please arrange. Thanks.

## 2025-05-02 NOTE — LETTER
May 2, 2025     WARREN Alvares  [address]  [address]  [facility] [address]    Patient: Ema Phan   YOB: 1985   Date of Visit: 2025       Dear WARREN Alvares,    Thank you for referring Ema Phan to me for evaluation. Below is a copy of my consult note.    If you have questions, please do not hesitate to call me. I look forward to following Ema along with you.         Sincerely,        [facility]        CC: No Recipients         New Patient Consult      Date: 2025   Patient Name: Ema Phan  MRN: [id number]  : 1985     Primary Care Provider: Michaela Vazquez APRN  Referring Provider: Shayna    Chief Complaint   Patient presents with   • Hepatitis C     Pt here for initial Hep C eval     History of Present Illness: Ema Phan is a 39 y.o. female who is here today as a consultation with Gastroenterology for evaluation of Hepatitis C.    She found out about having Hepatitis C infection approx 3 years ago. She has not had prior treatment for hepatitis. Does have history of fatty liver. She thinks her father had cirrhosis. She admits to previous IVDU and intranasal drug use. Has been clean ginger for over 1 year (2024), now following with [facility]. She does have nonprofessional tattoos. Denies having previous alcoholism. She does not currently drink alcohol. She denies  current illicit drug use including marijuana. No recent liver imaging, but has in the past. She has not had recent labs. She has not had previous vaccinations for Hepatitis A and B. She is currently , not working. Has transportation for [employer]. She has a dog named Mery (zak chua).     She has not had previous colonoscopy. Has been having rectal bleeding with each BM for approx 1 year now. Not using any hemorrhoid creams. She tried doing a colonoscopy in the past but was not able to drink the prep. There is no known family  history of colon cancer or colon polyps.    Subjective      Past Medical History:   Diagnosis Date   • Addiction    • Anxiety    • Depression    • Hepatitis C    • Migraine      Past Surgical History:   Procedure Laterality Date   •  SECTION      x 3   • FOREARM FRACTURE SURGERY Right    • TUBAL ABDOMINAL LIGATION       Family History   Problem Relation Age of Onset   • No Known Problems Mother    • No Known Problems Father    • Colon cancer Neg Hx    • Colon polyps Neg Hx      Social History     Socioeconomic History   • Marital status:    Tobacco Use   • Smoking status: Former     Current packs/day: 0.00     Types: Cigarettes     Quit date: 2021     Years since quitting: 3.6     Passive exposure: Past   • Smokeless tobacco: Never   Vaping Use   • Vaping status: Every Day   • Substances: Nicotine, Flavoring   • Devices: Disposable   • Passive vaping exposure: Yes   Substance and Sexual Activity   • Alcohol use: No   • Drug use: Not Currently     Types: Methamphetamines, Heroin, Marijuana     Comment: sober for three years   • Sexual activity: Defer     Current Outpatient Medications:   •  buprenorphine-naloxone (SUBOXONE) 8-2 MG per SL tablet, 2 tablets Daily., Disp: , Rfl:   •  busPIRone (BUSPAR) 15 MG tablet, Take 1 tablet by mouth 3 (Three) Times a Day., Disp: , Rfl:   •  FLUoxetine (PROzac) 20 MG capsule, Take 1 capsule by mouth Daily., Disp: , Rfl:   •  hydrOXYzine pamoate (VISTARIL) 25 MG capsule, Take 1 capsule by mouth 3 times a day., Disp: , Rfl:   •  prazosin (MINIPRESS) 2 MG capsule, Take 1 capsule by mouth Every Night., Disp: , Rfl:   •  promethazine (PHENERGAN) 25 MG tablet, Take 1 tablet by mouth Every 8 (Eight) Hours As Needed for Vomiting or Nausea., Disp: , Rfl:   •  tiZANidine (ZANAFLEX) 4 MG tablet, Take 1 tablet by mouth Every Night., Disp: , Rfl:   •  QUEtiapine (SEROquel) 100 MG tablet, Take 1 tablet by mouth every night at bedtime. (Patient not taking: Reported on  5/2/2025), Disp: 30 tablet, Rfl: 0    No Known Allergies    The following portions of the patient's history were reviewed and updated as appropriate: allergies, current medications, past family history, past medical history, past social history, past surgical history and problem list.    Objective     Physical Exam  Constitutional:       General: She is not in acute distress.     Appearance: Normal appearance. She is well-developed. She is not diaphoretic.   HENT:      Head: Normocephalic and atraumatic.      Right Ear: External ear normal.      Left Ear: External ear normal.      Nose: Nose normal.   Eyes:      General: No scleral icterus.        Right eye: No discharge.         Left eye: No discharge.      Conjunctiva/sclera: Conjunctivae normal.   Neck:      Trachea: No tracheal deviation.   Pulmonary:      Effort: Pulmonary effort is normal. No respiratory distress.   Musculoskeletal:         General: Normal range of motion.      Cervical back: Normal range of motion.   Skin:     Coloration: Skin is not pale.      Findings: No erythema or rash.   Neurological:      Mental Status: She is alert and oriented to person, place, and time.      Coordination: Coordination normal.   Psychiatric:         Mood and Affect: Mood normal.         Behavior: Behavior normal.         Thought Content: Thought content normal.         Judgment: Judgment normal.         Vitals:    05/02/25 1207   BP: 102/62   BP Location: Left arm   Patient Position: Sitting   Cuff Size: Adult   Pulse: 88   Temp: 98.4 °F (36.9 °C)   SpO2: 96%   Weight: 67.4 kg (148 lb 9.6 oz)     Results Review:   I have reviewed the patient's new clinical and imaging results.    COMPREHENSIVE METABOLIC PANEL (10/10/2024 21:48)  CBC AND DIFFERENTIAL (10/10/2024 21:48)  Hepatitis C Antibody - ED (09/03/2024 16:43)  HCV RNA BY PCR, QN RFX DENISA (09/03/2024 16:43)  ED HIV 1/2 Antibody/Antigen Screen w/Reflex to HIV 1/2 Differentiation (09/03/2024 16:43)    CTAP  6/2019  Review of lung windows demonstrates very mild bibasilar atelectasis.  Cholecystectomy clips are present. Liver is homogeneous, scattered  calcified granulomas are present in the spleen. The stomach is  decompressed. The pancreas and adrenal glands appear grossly normal on  this unenhanced study. No nephrolithiasis is identified. There are  extrarenal pelves of both kidneys, normal variant. The ureters are  decompressed. The bladder appears within normal limits. Bowel loops are  normal caliber and appear unremarkable. No free fluid or free air is  seen. There is evidence of previous bilateral tubal ligation. Mild  enlargement of the left ovary with scattered follicles. There is no  evidence of appendicitis. Review of bone windows is unremarkable.  IMPRESSION:  No acute intra-abdominal or pelvic abnormality.    Assessment / Plan      1. Chronic hepatitis C without hepatic coma  2. Elevated liver enzymes  3. Metabolic dysfunction-associated steatotic liver disease (MASLD)  She has chronic Hepatitis C infection, genotype unknown, treatment naive, without suspected cirrhosis. She will have labs today for further evaluation of chronic hep C infection as well as for consideration for treatment. Labs 9/2024 HCV RNA quant 197,838, elevated liver enzymes with , ALT 97, alk phos 159, Tbili 0.8. Hep B core IgM positive 3/2020. Had US liver in 2018 with elastography showed fatty liver with estimated F1 liver fibrosis.     More recommendations will be made after these results have been reviewed. She will continue to abstain from alcohol and illegal drugs. Immunity to Hep A and B will be determined and vaccinations recommended if needed. After review of these results and discussion with with the patient, we will proceed with Hep C therapy and will submit Rx to insurance company for approval. Treatment will depend on fibrosis score and Hep C genotype. When approved, she will  Rx from our pharmacy and start  taking exactly as directed. Hep C viral load lab (HCV RNA quant) and CMP will be checked 4 weeks after start of therapy, at end of therapy and 3 months s/p therapy to determine response to treatment and cure. She will call with concerns.      - CBC (No Diff); Future  - Comprehensive Metabolic Panel; Future  - HCV FibroSURE; Future  - HCV RNA By PCR, Qn Rfx Merly; Future  - Hepatitis A Antibody, Total; Future  - Hepatitis B Core Antibody, Total; Future  - Hepatitis B Surface Antigen; Future  - Hepatitis B Surface Antibody; Future  - HIV-1 / O / 2 Ag / Antibody; Future  - Protime-INR; Future  - Hepatitis B DNA, Quantitative, PCR; Future    4. Rectal bleeding  5. Encounter for colorectal cancer screening  Has been having rectal bleeding with all stools for the past 1 year. No abdominal pain. No constipation or diarrhea reported. No prior colonoscopy. No recent CBC on file. Suspect may have rectal outlet bleeding, others to rule out.     High fiber diet  Anusol BID PRN to start  Colonoscopy to be arranged in the GI clinic    - Hydrocortisone, Perianal, (Anusol-HC) 2.5 % rectal cream; Insert  into the rectum 2 (Two) Times a Day.  Dispense: 28 g; Refill: 1    She will have a colonoscopy performed with monitored anesthesia care. The indications, technique, alternatives and potential risk and complications were discussed with the patient including but not limited to bleeding, bowel perforations, missing lesions and anesthetic complications. The patient understands and wishes to proceed with the procedure and has given their verbal consent. Written patient education information was given to the patient. She should follow up in the office after this procedure to discuss the results and further recommendations can be made at that time. The patient will call if they have further questions before procedure.  - Case Request  - polyethylene glycol (MIRALAX) 17 GM/SCOOP powder; Take 17 g by mouth Daily. Mix with 8 oz liquid   Dispense: 510 g; Refill: 5  - bisacodyl (Dulcolax) 5 MG EC tablet; Follow instructions given at office  Dispense: 4 tablet; Refill: 0    Follow Up:   Return in about 6 weeks (around 6/13/2025) for recheck Hepatitis C.    Kay Santos PA-C  [facility]  5/2/2025  15:23 EDT

## 2025-05-03 LAB — HBV CORE AB SERPL QL IA: POSITIVE

## 2025-05-04 LAB — HAV AB SER QL IA: POSITIVE

## 2025-05-04 NOTE — PROGRESS NOTES
[facility] IOP GROUP     Date: 04/17/2025  Name: Ema Phan    Time In: 1800   Time Out: 2036     Number of participants: 12    IOP GROUP NOTE     Services Provided  Group Psychotherapy: 3 hour IOP group therapy session (Check-ins, Coping Skills, Relapse Prevention)  Education and Training:  Activity Therapy:  Individual Therapy:  Family Therapy:  MD Initial:  MD Follow-Up     Check Ins: Therapist continued facilitation of rapport building strategies between group members. Therapist asked that each patient check in with home life and recovery efforts and identify triggers, cravings, and high risk situations that arise between group sessions. Therapist provided empathy and support during group session.     Session Content/Coping Skills: Anjana (Behavioral Health Student/) attended for session. , Armen, spoke during first part of session and educated group regarding case management services. Anjana facilitated 3 good people strengths identification activity (retrieved from therapist aid). Living in Balance- Session 12- part 1 reviewed and discussed. Group members participated in “Life Jeopardy” activity (retrieved from taking the escalator).     Response: Patient attended class in person. Patient participated in completion of check in form. Patient on check in form answered no to question of “currently or since your last group meeting, have you had any suicidal thoughts or plan or intent to hurt yourself”, and patient also answered no on check in form to question of “currently or since your last group meeting, have you had any homicidal thoughts or plan or intent to hurt others”. Patient discussed that seroquel gave her restless leg. Patient on check in form reported concerns with sleep since last group meeting.     Personal Assessment 0-10 Scale (0-none, 10-high)    Anxiety:  9   Depression:  7   Cravings: 0     Assessment:     ..  Lab on 04/14/2025   Component Date Value Ref  Range Status    THC, Screen, Urine 04/14/2025 Positive (A)  Negative Final    Phencyclidine (PCP), Urine 04/14/2025 Negative  Negative Final    Cocaine Screen, Urine 04/14/2025 Negative  Negative Final    Methamphetamine, Ur 04/14/2025 Negative  Negative Final    Opiate Screen 04/14/2025 Negative  Negative Final    Amphetamine Screen, Urine 04/14/2025 Negative  Negative Final    Benzodiazepine Screen, Urine 04/14/2025 Positive (A)  Negative Final    Tricyclic Antidepressants Screen 04/14/2025 Positive (A)  Negative Final    Methadone Screen, Urine 04/14/2025 Negative  Negative Final    Barbiturates Screen, Urine 04/14/2025 Negative  Negative Final    Oxycodone Screen, Urine 04/14/2025 Negative  Negative Final    Buprenorphine, Screen, Urine 04/14/2025 Positive (A)  Negative Final    Barbiturates 04/14/2025 Not Detected  200 ng/mL ng/mL Final    EDDP 04/14/2025 Not Detected  100 ng/mL ng/mL Final    6-ACETYLMORPHINE 04/14/2025 Not Detected  10 ng/mL ng/mL Final    AMPHETAMINE 04/14/2025 Not Detected  500 ng/mL ng/mL Final    Benzodiazepines 04/14/2025 Detected  200 ng/mL ng/mL Final    BUPRENORPHINE 04/14/2025 Detected  5 ng/mL ng/mL Final    Cocaine Metabolite 04/14/2025 Not Detected  150 ng/mL ng/mL Final    ALCOHOL, ETHYL 04/14/2025 Not Detected  <100.0000 ng/mL Final    FENTANYL 04/14/2025 Not Detected  1 ng/mL ng/mL Final    METHAMPHETAMINE 04/14/2025 Not Detected  500 ng/mL ng/mL Final    Opiates 04/14/2025 Not Detected  300 ng/mL ng/mL Final    OXYCODONE 04/14/2025 Not Detected  100 ng/mL ng/mL Final    PCP 04/14/2025 Not Detected  25 ng/mL ng/mL Final    THC 04/14/2025 Detected  50 ng/mL ng/mL Final    TRICYCLIC ANTIDEPRESSANTS 04/14/2025 Not Detected  300 ng/mL ng/mL Final    Reference Lab Report 04/14/2025    Final    See Full Screen for results.    METHYLPHENIDATE 04/14/2025 Not Detected  10 ng/mL ng/mL Final    ZOLPIDEM 04/14/2025 Not Detected  2 ng/mL ng/mL Final    O-Desmethyltramadol 04/14/2025 Not  Detected  100 ng/mL ng/mL Final    Tramadol 04/14/2025 Not Detected  50 ng/mL ng/mL Final    9-DELTA-THC-COOH 04/14/2025 234^DETECTED  100 ng/mL ng/mL Final    Detected    6-acetylmorphine 04/14/2025 Not Detected  10 ng/mL ng/mL Final    PHENCYCLIDINE 04/14/2025 Not Detected  20 ng/mL ng/mL Final    PREGABALIN 04/14/2025 Not Detected  250 ng/mL ng/mL Final    Oxycodone, Confirmation, Urine 04/14/2025 Not Detected  50 ng/mL ng/mL Final    OXYMORPHONE 04/14/2025 Not Detected  50 ng/mL ng/mL Final    NOROXYCODONE 04/14/2025 Not Detected  50 ng/mL ng/mL Final    METHAMPHETAMINE 04/14/2025 Not Detected  100 ng/mL ng/mL Final    EDDP 04/14/2025 Not Detected  50 ng/mL ng/mL Final    METHADONE 04/14/2025 Not Detected  25 ng/mL ng/mL Final    GABAPENTIN 04/14/2025 Not Detected  500 ng/mL ng/mL Final    BUPRENORPHINE 04/14/2025 265^DETECTED  10 ng/mL ng/mL Final    Detected    NORBUPRENORPHINE 04/14/2025 336^DETECTED  10 ng/mL ng/mL Final    Detected    BENZOYLECGONINE 04/14/2025 Not Detected  100 ng/mL ng/mL Final    PHENTERMINE 04/14/2025 Not Detected  100 ng/mL ng/mL Final    AMPHETAMINE 04/14/2025 Not Detected  250 ng/mL ng/mL Final    FENTANYL 04/14/2025 Not Detected  2 ng/mL ng/mL Final    NORFENTANYL 04/14/2025 Not Detected  10 ng/mL ng/mL Final    7- AMINOCLONAZEPAM 04/14/2025 Not Detected  50 ng/mL ng/mL Final    ALPHA-HYDROXYALPRAZOLAM 04/14/2025 152^DETECTED  50 ng/mL ng/mL Final    Detected    Alprazolam 04/14/2025 55^DETECTED  50 ng/mL ng/mL Final    Detected    CLONAZEPAM 04/14/2025 Not Detected  50 ng/mL ng/mL Final    DIAZEPAM 04/14/2025 Not Detected  50 ng/mL ng/mL Final    Flunitrazepam 04/14/2025 Not Detected  50 ng/mL ng/mL Final    FLURAZEPAM 04/14/2025 Not Detected  50 ng/mL ng/mL Final    LORAZEPAM 04/14/2025 Not Detected  100 ng/mL ng/mL Final    Midazolam, Urine 04/14/2025 Not Detected  50 ng/mL ng/mL Final    NORDIAZEPAM 04/14/2025 Not Detected  50 ng/mL ng/mL Final    OXAZEPAM 04/14/2025 Not  Detected  50 ng/mL ng/mL Final    TEMAZEPAM 04/14/2025 Not Detected  50 ng/mL ng/mL Final   Lab on 04/09/2025   Component Date Value Ref Range Status    THC, Screen, Urine 04/09/2025 Positive (A)  Negative Final    Phencyclidine (PCP), Urine 04/09/2025 Negative  Negative Final    Cocaine Screen, Urine 04/09/2025 Negative  Negative Final    Methamphetamine, Ur 04/09/2025 Negative  Negative Final    Opiate Screen 04/09/2025 Negative  Negative Final    Amphetamine Screen, Urine 04/09/2025 Negative  Negative Final    Benzodiazepine Screen, Urine 04/09/2025 Positive (A)  Negative Final    Tricyclic Antidepressants Screen 04/09/2025 Negative  Negative Final    Methadone Screen, Urine 04/09/2025 Negative  Negative Final    Barbiturates Screen, Urine 04/09/2025 Negative  Negative Final    Oxycodone Screen, Urine 04/09/2025 Negative  Negative Final    Buprenorphine, Screen, Urine 04/09/2025 Positive (A)  Negative Final    FENTANYL 04/09/2025 Not Detected  2 ng/mL ng/mL Final    NORFENTANYL 04/09/2025 Not Detected  10 ng/mL ng/mL Final    AMPHETAMINE 04/09/2025 Not Detected  250 ng/mL ng/mL Final    PHENTERMINE 04/09/2025 Not Detected  100 ng/mL ng/mL Final    EDDP 04/09/2025 Not Detected  50 ng/mL ng/mL Final    METHADONE 04/09/2025 Not Detected  25 ng/mL ng/mL Final    NOROXYCODONE 04/09/2025 Not Detected  50 ng/mL ng/mL Final    Oxycodone, Confirmation, Urine 04/09/2025 Not Detected  50 ng/mL ng/mL Final    OXYMORPHONE 04/09/2025 Not Detected  50 ng/mL ng/mL Final    PHENCYCLIDINE 04/09/2025 Not Detected  20 ng/mL ng/mL Final    9-DELTA-THC-COOH 04/09/2025 41^NOT DETECTED  100 ng/mL ng/mL Final    Not Detected    6-ACETYLMORPHINE 04/09/2025 Not Detected  10 ng/mL ng/mL Final    AMPHETAMINE 04/09/2025 Not Detected  500 ng/mL ng/mL Final    Barbiturates 04/09/2025 Not Detected  200 ng/mL ng/mL Final    Benzodiazepines 04/09/2025 Detected  200 ng/mL ng/mL Final    BUPRENORPHINE 04/09/2025 Detected  5 ng/mL ng/mL Final     Cocaine Metabolite 04/09/2025 Not Detected  150 ng/mL ng/mL Final    EDDP 04/09/2025 Not Detected  100 ng/mL ng/mL Final    ALCOHOL, ETHYL 04/09/2025 Not Detected  <100.0000 ng/mL Final    FENTANYL 04/09/2025 Not Detected  1 ng/mL ng/mL Final    METHAMPHETAMINE 04/09/2025 Not Detected  500 ng/mL ng/mL Final    Opiates 04/09/2025 Not Detected  300 ng/mL ng/mL Final    OXYCODONE 04/09/2025 Not Detected  100 ng/mL ng/mL Final    PCP 04/09/2025 Not Detected  25 ng/mL ng/mL Final    THC 04/09/2025 Detected  50 ng/mL ng/mL Final    TRICYCLIC ANTIDEPRESSANTS 04/09/2025 Not Detected  300 ng/mL ng/mL Final    Reference Lab Report 04/09/2025    Final    See Full Screen for results.    METHYLPHENIDATE 04/09/2025 Not Detected  10 ng/mL ng/mL Final    6-acetylmorphine 04/09/2025 Not Detected  10 ng/mL ng/mL Final    BUPRENORPHINE 04/09/2025 123^DETECTED  10 ng/mL ng/mL Final    Detected    NORBUPRENORPHINE 04/09/2025 81^DETECTED  10 ng/mL ng/mL Final    Detected    ZOLPIDEM 04/09/2025 Not Detected  2 ng/mL ng/mL Final    7- AMINOCLONAZEPAM 04/09/2025 42^NOT DETECTED  50 ng/mL ng/mL Final    Not Detected    ALPHA-HYDROXYALPRAZOLAM 04/09/2025 471^DETECTED  50 ng/mL ng/mL Final    Detected    Alprazolam 04/09/2025 205^DETECTED  50 ng/mL ng/mL Final    Detected    CLONAZEPAM 04/09/2025 Not Detected  50 ng/mL ng/mL Final    DIAZEPAM 04/09/2025 Not Detected  50 ng/mL ng/mL Final    Flunitrazepam 04/09/2025 Not Detected  50 ng/mL ng/mL Final    FLURAZEPAM 04/09/2025 Not Detected  50 ng/mL ng/mL Final    LORAZEPAM 04/09/2025 Not Detected  100 ng/mL ng/mL Final    Midazolam, Urine 04/09/2025 Not Detected  50 ng/mL ng/mL Final    NORDIAZEPAM 04/09/2025 Not Detected  50 ng/mL ng/mL Final    OXAZEPAM 04/09/2025 Not Detected  50 ng/mL ng/mL Final    TEMAZEPAM 04/09/2025 Not Detected  50 ng/mL ng/mL Final    BENZOYLECGONINE 04/09/2025 Not Detected  100 ng/mL ng/mL Final    GABAPENTIN 04/09/2025 Not Detected  500 ng/mL ng/mL Final     METHAMPHETAMINE 04/09/2025 Not Detected  100 ng/mL ng/mL Final    PREGABALIN 04/09/2025 Not Detected  250 ng/mL ng/mL Final    O-Desmethyltramadol 04/09/2025 Not Detected  100 ng/mL ng/mL Final    Tramadol 04/09/2025 Not Detected  50 ng/mL ng/mL Final       Mental Status Exam  Hygiene:  good  Dress: casual  Attitude: cooperative and agreeable   Motor Activity: appropriate  Eye Contact:  good  Speech: regular rate and rhythm   Mood:  calm and cooperative  Affect:  Appropriate  Thought Processes:  Linear  Thought Content:  Normal  Suicidal Thoughts:  denies  Homicidal Thoughts:  denies  Crisis Safety Plan: Safety plan has been discussed.   Hallucinations:  Unknown to clinician.   Reliability: fair  Insight: fair  Judgement: fair  Impulse Control: fair    Recovery/spiritual support group attendance: No.      Progress toward goal: Not at goal    Prognosis: Fair with Ongoing Treatment     Self-reported number of days sober: Patient on check in form reported July 13, 24. Patient did report Xanax use 3 days ago.     Patient will contact this office, call 911 or present to the nearest emergency room should suicidal or homicidal ideations occur.    Impression/Formulation:    ICD-10-CM ICD-9-CM   1. Opioid use disorder, severe, on maintenance therapy  F11.20 304.00   2. Methamphetamine use disorder, severe, in early remission  F15.21 304.43   3. Sedative, hypnotic or anxiolytic use disorder, severe, dependence  F13.20 304.10   4. Moderate cannabis use disorder  F12.20 304.30       Clinical Maneuvering/Interventions: Therapist utilized a person-centered approach to build rapport with group member. Therapist implemented motivational interviewing techniques to assist client with exploring and resolving ambivalence associated with commitment to change behaviors related to substance use and addiction. Therapist applied cognitive behavioral strategies to facilitate identification of maladaptive patterns of thinking and behavior  that contribute to client's risk for continued substance use and relapse. Therapist employed group interaction activities to build rapport among group members, promote sobriety, and emphasize relapse prevention. Therapist promoted safe nonjudgmental environment by providing group members with unconditional positive regard and encouraging group members to comply with group rules and guidelines. Therapist assisted group member with identifying and implementing healthier coping strategies.      Plan:  Continue [facility] IOP Phase I   Aftercare:  [facility] Phase II  Program Assignments:  Personal recovery plan, relapse prevention plan, attendance of recovery support group meetings, exploration of sponsorship, drug/alcohol screens.     Andre Zuñiga LCSW  5/4/2025      Part of this note may be an electronic transcription/translation of spoken language to printed text using the Dragon Dictation System.

## 2025-05-04 NOTE — PROGRESS NOTES
[facility]     Date: 04/28/2025  Name: Ema Phan    Time In: 1805   Time Out: 2046     Number of participants: 8    IOP GROUP NOTE     Services Provided  Group Psychotherapy: 3 hour IOP group therapy session (Check-ins, Coping Skills, Relapse Prevention)  Education and Training:  Activity Therapy:  Individual Therapy:  Family Therapy:  MD Initial:  MD Follow-Up     Check Ins: Therapist continued facilitation of rapport building strategies between group members. Therapist asked that each patient check in with home life and recovery efforts and identify triggers, cravings, and high risk situations that arise between group sessions. Therapist provided empathy and support during group session.     Session Content/Coping Skills: Clinician facilitated open discussion with group members regarding addiction recovery and recovery action steps. Individual check-ins completed by group members. Group members participated in completion of addiction recovery crossword activity. Clinician facilitated discussion of each answer listed on crossword activity.     Response: Patient attended class in person. Patient participated in completion of check in form. Patient on check in form answered no to question of “currently or since your last group meeting, have you had any suicidal thoughts or plan or intent to hurt yourself”, and patient also answered no on check in form to question of “currently or since your last group meeting, have you had any homicidal thoughts or plan or intent to hurt others”. Patient on check in form reported concerns with sleep, appetite, and medication concerns since last group meeting.     Patient reported that she cannot take seroquel due to restless leg but as not taken restless leg medication as patient reports that it is not working. APRN notified on 4/29. Patient reports that she meets with her MD on the 7th.     Patient reported that she has not used Xanax in 3 days. Patient reported  withdrawal symptoms of feeling shaky inside and trouble sleeping. Clinician reminded patient to go to hospital with withdrawal symptoms.     Patient denied suicidal or homicidal thoughts since in group.     Patient identified coping skills of cleaning, trying to stay busy, and taking dog on walks.     Personal Assessment 0-10 Scale (0-none, 10-high)    Anxiety:  7   Depression:  10   Cravings: Patient did not record answer.      Assessment:     ..  Lab on 04/28/2025   Component Date Value Ref Range Status    THC, Screen, Urine 04/28/2025 Negative  Negative Final    Phencyclidine (PCP), Urine 04/28/2025 Negative  Negative Final    Cocaine Screen, Urine 04/28/2025 Negative  Negative Final    Methamphetamine, Ur 04/28/2025 Negative  Negative Final    Opiate Screen 04/28/2025 Negative  Negative Final    Amphetamine Screen, Urine 04/28/2025 Negative  Negative Final    Benzodiazepine Screen, Urine 04/28/2025 Negative  Negative Final    Tricyclic Antidepressants Screen 04/28/2025 Negative  Negative Final    Methadone Screen, Urine 04/28/2025 Negative  Negative Final    Barbiturates Screen, Urine 04/28/2025 Negative  Negative Final    Oxycodone Screen, Urine 04/28/2025 Negative  Negative Final    Buprenorphine, Screen, Urine 04/28/2025 Negative  Negative Final    6-ACETYLMORPHINE 04/28/2025 Not Detected  10 ng/mL ng/mL Final    AMPHETAMINE 04/28/2025 Not Detected  500 ng/mL ng/mL Final    Barbiturates 04/28/2025 Not Detected  200 ng/mL ng/mL Final    Benzodiazepines 04/28/2025 Not Detected  200 ng/mL ng/mL Final    BUPRENORPHINE 04/28/2025 Not Detected  5 ng/mL ng/mL Final    Cocaine Metabolite 04/28/2025 Not Detected  150 ng/mL ng/mL Final    EDDP 04/28/2025 Not Detected  100 ng/mL ng/mL Final    ALCOHOL, ETHYL 04/28/2025 Not Detected  <100.0000 ng/mL Final    FENTANYL 04/28/2025 Not Detected  1 ng/mL ng/mL Final    METHAMPHETAMINE 04/28/2025 Not Detected  500 ng/mL ng/mL Final    Opiates 04/28/2025 Not Detected  300 ng/mL  ng/mL Final    OXYCODONE 04/28/2025 Not Detected  100 ng/mL ng/mL Final    PCP 04/28/2025 Not Detected  25 ng/mL ng/mL Final    THC 04/28/2025 Not Detected  50 ng/mL ng/mL Final    TRICYCLIC ANTIDEPRESSANTS 04/28/2025 Not Detected  300 ng/mL ng/mL Final    Reference Lab Report 04/28/2025    Final    See Full Screen for results.    METHYLPHENIDATE 04/28/2025 Not Detected  10 ng/mL ng/mL Final    BUPRENORPHINE 04/28/2025 Not Detected  10 ng/mL ng/mL Final    NORBUPRENORPHINE 04/28/2025 Not Detected  10 ng/mL ng/mL Final    PREGABALIN 04/28/2025 Not Detected  250 ng/mL ng/mL Final    9-DELTA-THC-COOH 04/28/2025 Not Detected  100 ng/mL ng/mL Final    ZOLPIDEM 04/28/2025 Not Detected  2 ng/mL ng/mL Final    O-Desmethyltramadol 04/28/2025 Not Detected  100 ng/mL ng/mL Final    Tramadol 04/28/2025 Not Detected  50 ng/mL ng/mL Final    GABAPENTIN 04/28/2025 Not Detected  500 ng/mL ng/mL Final   Lab on 04/23/2025   Component Date Value Ref Range Status    THC, Screen, Urine 04/23/2025 Positive (A)  Negative Final    Phencyclidine (PCP), Urine 04/23/2025 Negative  Negative Final    Cocaine Screen, Urine 04/23/2025 Negative  Negative Final    Methamphetamine, Ur 04/23/2025 Negative  Negative Final    Opiate Screen 04/23/2025 Negative  Negative Final    Amphetamine Screen, Urine 04/23/2025 Negative  Negative Final    Benzodiazepine Screen, Urine 04/23/2025 Positive (A)  Negative Final    Tricyclic Antidepressants Screen 04/23/2025 Negative  Negative Final    Methadone Screen, Urine 04/23/2025 Negative  Negative Final    Barbiturates Screen, Urine 04/23/2025 Negative  Negative Final    Oxycodone Screen, Urine 04/23/2025 Negative  Negative Final    Buprenorphine, Screen, Urine 04/23/2025 Positive (A)  Negative Final    Reference Lab Report 04/23/2025    Final    See Full Screen for results.    GABAPENTIN 04/23/2025 Not Detected  500 ng/mL ng/mL Final    O-Desmethyltramadol 04/23/2025 Not Detected  100 ng/mL ng/mL Final     Tramadol 04/23/2025 Not Detected  50 ng/mL ng/mL Final    ZOLPIDEM 04/23/2025 Not Detected  2 ng/mL ng/mL Final    9-DELTA-THC-COOH 04/23/2025 124^DETECTED  100 ng/mL ng/mL Final    PREGABALIN 04/23/2025 Not Detected  250 ng/mL ng/mL Final    BUPRENORPHINE 04/23/2025 356^DETECTED  10 ng/mL ng/mL Final    NORBUPRENORPHINE 04/23/2025 335^DETECTED  10 ng/mL ng/mL Final    METHYLPHENIDATE 04/23/2025 Not Detected  10 ng/mL ng/mL Final    6-ACETYLMORPHINE 04/23/2025 Not Detected  10 ng/mL ng/mL Final    AMPHETAMINE 04/23/2025 Not Detected  500 ng/mL ng/mL Final    Barbiturates 04/23/2025 Not Detected  200 ng/mL ng/mL Final    Benzodiazepines 04/23/2025 Detected  200 ng/mL ng/mL Final    BUPRENORPHINE 04/23/2025 Detected  5 ng/mL ng/mL Final    Cocaine Metabolite 04/23/2025 Not Detected  150 ng/mL ng/mL Final    EDDP 04/23/2025 Not Detected  100 ng/mL ng/mL Final    ALCOHOL, ETHYL 04/23/2025 Not Detected  <100.0000 ng/mL Final    FENTANYL 04/23/2025 Not Detected  1 ng/mL ng/mL Final    METHAMPHETAMINE 04/23/2025 Not Detected  500 ng/mL ng/mL Final    Opiates 04/23/2025 Not Detected  300 ng/mL ng/mL Final    OXYCODONE 04/23/2025 Not Detected  100 ng/mL ng/mL Final    PCP 04/23/2025 Not Detected  25 ng/mL ng/mL Final    THC 04/23/2025 Detected  50 ng/mL ng/mL Final    TRICYCLIC ANTIDEPRESSANTS 04/23/2025 Not Detected  300 ng/mL ng/mL Final    Reference Lab Report 04/23/2025    Final    See Full Screen for results.    7- AMINOCLONAZEPAM 04/23/2025 Not Detected  50 ng/mL ng/mL Final    ALPHA-HYDROXYALPRAZOLAM 04/23/2025 455^DETECTED  50 ng/mL ng/mL Final    Alprazolam 04/23/2025 175^DETECTED  50 ng/mL ng/mL Final    CLONAZEPAM 04/23/2025 Not Detected  50 ng/mL ng/mL Final    DIAZEPAM 04/23/2025 Not Detected  50 ng/mL ng/mL Final    Flunitrazepam 04/23/2025 Not Detected  50 ng/mL ng/mL Final    FLURAZEPAM 04/23/2025 Not Detected  50 ng/mL ng/mL Final    LORAZEPAM 04/23/2025 Not Detected  100 ng/mL ng/mL Final    Midazolam,  Urine 04/23/2025 Not Detected  50 ng/mL ng/mL Final    NORDIAZEPAM 04/23/2025 Not Detected  50 ng/mL ng/mL Final    OXAZEPAM 04/23/2025 Not Detected  50 ng/mL ng/mL Final    TEMAZEPAM 04/23/2025 Not Detected  50 ng/mL ng/mL Final   Lab on 04/14/2025   Component Date Value Ref Range Status    THC, Screen, Urine 04/14/2025 Positive (A)  Negative Final    Phencyclidine (PCP), Urine 04/14/2025 Negative  Negative Final    Cocaine Screen, Urine 04/14/2025 Negative  Negative Final    Methamphetamine, Ur 04/14/2025 Negative  Negative Final    Opiate Screen 04/14/2025 Negative  Negative Final    Amphetamine Screen, Urine 04/14/2025 Negative  Negative Final    Benzodiazepine Screen, Urine 04/14/2025 Positive (A)  Negative Final    Tricyclic Antidepressants Screen 04/14/2025 Positive (A)  Negative Final    Methadone Screen, Urine 04/14/2025 Negative  Negative Final    Barbiturates Screen, Urine 04/14/2025 Negative  Negative Final    Oxycodone Screen, Urine 04/14/2025 Negative  Negative Final    Buprenorphine, Screen, Urine 04/14/2025 Positive (A)  Negative Final    Barbiturates 04/14/2025 Not Detected  200 ng/mL ng/mL Final    EDDP 04/14/2025 Not Detected  100 ng/mL ng/mL Final    6-ACETYLMORPHINE 04/14/2025 Not Detected  10 ng/mL ng/mL Final    AMPHETAMINE 04/14/2025 Not Detected  500 ng/mL ng/mL Final    Benzodiazepines 04/14/2025 Detected  200 ng/mL ng/mL Final    BUPRENORPHINE 04/14/2025 Detected  5 ng/mL ng/mL Final    Cocaine Metabolite 04/14/2025 Not Detected  150 ng/mL ng/mL Final    ALCOHOL, ETHYL 04/14/2025 Not Detected  <100.0000 ng/mL Final    FENTANYL 04/14/2025 Not Detected  1 ng/mL ng/mL Final    METHAMPHETAMINE 04/14/2025 Not Detected  500 ng/mL ng/mL Final    Opiates 04/14/2025 Not Detected  300 ng/mL ng/mL Final    OXYCODONE 04/14/2025 Not Detected  100 ng/mL ng/mL Final    PCP 04/14/2025 Not Detected  25 ng/mL ng/mL Final    THC 04/14/2025 Detected  50 ng/mL ng/mL Final    TRICYCLIC ANTIDEPRESSANTS  04/14/2025 Not Detected  300 ng/mL ng/mL Final    Reference Lab Report 04/14/2025    Final    See Full Screen for results.    METHYLPHENIDATE 04/14/2025 Not Detected  10 ng/mL ng/mL Final    ZOLPIDEM 04/14/2025 Not Detected  2 ng/mL ng/mL Final    O-Desmethyltramadol 04/14/2025 Not Detected  100 ng/mL ng/mL Final    Tramadol 04/14/2025 Not Detected  50 ng/mL ng/mL Final    9-DELTA-THC-COOH 04/14/2025 234^DETECTED  100 ng/mL ng/mL Final    Detected    6-acetylmorphine 04/14/2025 Not Detected  10 ng/mL ng/mL Final    PHENCYCLIDINE 04/14/2025 Not Detected  20 ng/mL ng/mL Final    PREGABALIN 04/14/2025 Not Detected  250 ng/mL ng/mL Final    Oxycodone, Confirmation, Urine 04/14/2025 Not Detected  50 ng/mL ng/mL Final    OXYMORPHONE 04/14/2025 Not Detected  50 ng/mL ng/mL Final    NOROXYCODONE 04/14/2025 Not Detected  50 ng/mL ng/mL Final    METHAMPHETAMINE 04/14/2025 Not Detected  100 ng/mL ng/mL Final    EDDP 04/14/2025 Not Detected  50 ng/mL ng/mL Final    METHADONE 04/14/2025 Not Detected  25 ng/mL ng/mL Final    GABAPENTIN 04/14/2025 Not Detected  500 ng/mL ng/mL Final    BUPRENORPHINE 04/14/2025 265^DETECTED  10 ng/mL ng/mL Final    Detected    NORBUPRENORPHINE 04/14/2025 336^DETECTED  10 ng/mL ng/mL Final    Detected    BENZOYLECGONINE 04/14/2025 Not Detected  100 ng/mL ng/mL Final    PHENTERMINE 04/14/2025 Not Detected  100 ng/mL ng/mL Final    AMPHETAMINE 04/14/2025 Not Detected  250 ng/mL ng/mL Final    FENTANYL 04/14/2025 Not Detected  2 ng/mL ng/mL Final    NORFENTANYL 04/14/2025 Not Detected  10 ng/mL ng/mL Final    7- AMINOCLONAZEPAM 04/14/2025 Not Detected  50 ng/mL ng/mL Final    ALPHA-HYDROXYALPRAZOLAM 04/14/2025 152^DETECTED  50 ng/mL ng/mL Final    Detected    Alprazolam 04/14/2025 55^DETECTED  50 ng/mL ng/mL Final    Detected    CLONAZEPAM 04/14/2025 Not Detected  50 ng/mL ng/mL Final    DIAZEPAM 04/14/2025 Not Detected  50 ng/mL ng/mL Final    Flunitrazepam 04/14/2025 Not Detected  50 ng/mL ng/mL  Final    FLURAZEPAM 04/14/2025 Not Detected  50 ng/mL ng/mL Final    LORAZEPAM 04/14/2025 Not Detected  100 ng/mL ng/mL Final    Midazolam, Urine 04/14/2025 Not Detected  50 ng/mL ng/mL Final    NORDIAZEPAM 04/14/2025 Not Detected  50 ng/mL ng/mL Final    OXAZEPAM 04/14/2025 Not Detected  50 ng/mL ng/mL Final    TEMAZEPAM 04/14/2025 Not Detected  50 ng/mL ng/mL Final   Lab on 04/09/2025   Component Date Value Ref Range Status    THC, Screen, Urine 04/09/2025 Positive (A)  Negative Final    Phencyclidine (PCP), Urine 04/09/2025 Negative  Negative Final    Cocaine Screen, Urine 04/09/2025 Negative  Negative Final    Methamphetamine, Ur 04/09/2025 Negative  Negative Final    Opiate Screen 04/09/2025 Negative  Negative Final    Amphetamine Screen, Urine 04/09/2025 Negative  Negative Final    Benzodiazepine Screen, Urine 04/09/2025 Positive (A)  Negative Final    Tricyclic Antidepressants Screen 04/09/2025 Negative  Negative Final    Methadone Screen, Urine 04/09/2025 Negative  Negative Final    Barbiturates Screen, Urine 04/09/2025 Negative  Negative Final    Oxycodone Screen, Urine 04/09/2025 Negative  Negative Final    Buprenorphine, Screen, Urine 04/09/2025 Positive (A)  Negative Final    FENTANYL 04/09/2025 Not Detected  2 ng/mL ng/mL Final    NORFENTANYL 04/09/2025 Not Detected  10 ng/mL ng/mL Final    AMPHETAMINE 04/09/2025 Not Detected  250 ng/mL ng/mL Final    PHENTERMINE 04/09/2025 Not Detected  100 ng/mL ng/mL Final    EDDP 04/09/2025 Not Detected  50 ng/mL ng/mL Final    METHADONE 04/09/2025 Not Detected  25 ng/mL ng/mL Final    NOROXYCODONE 04/09/2025 Not Detected  50 ng/mL ng/mL Final    Oxycodone, Confirmation, Urine 04/09/2025 Not Detected  50 ng/mL ng/mL Final    OXYMORPHONE 04/09/2025 Not Detected  50 ng/mL ng/mL Final    PHENCYCLIDINE 04/09/2025 Not Detected  20 ng/mL ng/mL Final    9-DELTA-THC-COOH 04/09/2025 41^NOT DETECTED  100 ng/mL ng/mL Final    Not Detected    6-ACETYLMORPHINE 04/09/2025 Not  Detected  10 ng/mL ng/mL Final    AMPHETAMINE 04/09/2025 Not Detected  500 ng/mL ng/mL Final    Barbiturates 04/09/2025 Not Detected  200 ng/mL ng/mL Final    Benzodiazepines 04/09/2025 Detected  200 ng/mL ng/mL Final    BUPRENORPHINE 04/09/2025 Detected  5 ng/mL ng/mL Final    Cocaine Metabolite 04/09/2025 Not Detected  150 ng/mL ng/mL Final    EDDP 04/09/2025 Not Detected  100 ng/mL ng/mL Final    ALCOHOL, ETHYL 04/09/2025 Not Detected  <100.0000 ng/mL Final    FENTANYL 04/09/2025 Not Detected  1 ng/mL ng/mL Final    METHAMPHETAMINE 04/09/2025 Not Detected  500 ng/mL ng/mL Final    Opiates 04/09/2025 Not Detected  300 ng/mL ng/mL Final    OXYCODONE 04/09/2025 Not Detected  100 ng/mL ng/mL Final    PCP 04/09/2025 Not Detected  25 ng/mL ng/mL Final    THC 04/09/2025 Detected  50 ng/mL ng/mL Final    TRICYCLIC ANTIDEPRESSANTS 04/09/2025 Not Detected  300 ng/mL ng/mL Final    Reference Lab Report 04/09/2025    Final    See Full Screen for results.    METHYLPHENIDATE 04/09/2025 Not Detected  10 ng/mL ng/mL Final    6-acetylmorphine 04/09/2025 Not Detected  10 ng/mL ng/mL Final    BUPRENORPHINE 04/09/2025 123^DETECTED  10 ng/mL ng/mL Final    Detected    NORBUPRENORPHINE 04/09/2025 81^DETECTED  10 ng/mL ng/mL Final    Detected    ZOLPIDEM 04/09/2025 Not Detected  2 ng/mL ng/mL Final    7- AMINOCLONAZEPAM 04/09/2025 42^NOT DETECTED  50 ng/mL ng/mL Final    Not Detected    ALPHA-HYDROXYALPRAZOLAM 04/09/2025 471^DETECTED  50 ng/mL ng/mL Final    Detected    Alprazolam 04/09/2025 205^DETECTED  50 ng/mL ng/mL Final    Detected    CLONAZEPAM 04/09/2025 Not Detected  50 ng/mL ng/mL Final    DIAZEPAM 04/09/2025 Not Detected  50 ng/mL ng/mL Final    Flunitrazepam 04/09/2025 Not Detected  50 ng/mL ng/mL Final    FLURAZEPAM 04/09/2025 Not Detected  50 ng/mL ng/mL Final    LORAZEPAM 04/09/2025 Not Detected  100 ng/mL ng/mL Final    Midazolam, Urine 04/09/2025 Not Detected  50 ng/mL ng/mL Final    NORDIAZEPAM 04/09/2025 Not Detected   50 ng/mL ng/mL Final    OXAZEPAM 04/09/2025 Not Detected  50 ng/mL ng/mL Final    TEMAZEPAM 04/09/2025 Not Detected  50 ng/mL ng/mL Final    BENZOYLECGONINE 04/09/2025 Not Detected  100 ng/mL ng/mL Final    GABAPENTIN 04/09/2025 Not Detected  500 ng/mL ng/mL Final    METHAMPHETAMINE 04/09/2025 Not Detected  100 ng/mL ng/mL Final    PREGABALIN 04/09/2025 Not Detected  250 ng/mL ng/mL Final    O-Desmethyltramadol 04/09/2025 Not Detected  100 ng/mL ng/mL Final    Tramadol 04/09/2025 Not Detected  50 ng/mL ng/mL Final       Mental Status Exam  Hygiene:  good  Dress: casual  Attitude: cooperative and agreeable   Motor Activity: appropriate  Eye Contact:  good  Speech: regular rate and rhythm   Mood:  calm and cooperative  Affect:  Appropriate  Thought Processes:  Linear  Thought Content:  Normal  Suicidal Thoughts:  denies  Homicidal Thoughts:  denies  Crisis Safety Plan: Safety plan has been discussed.   Hallucinations:  Unknown to clinician.   Reliability: fair  Insight: fair  Judgement: fair  Impulse Control: fair    Recovery/spiritual support group attendance: Patient on check in form reported 2.      Progress toward goal: Not at goal    Prognosis: Fair with Ongoing Treatment     Self-reported number of days sober: Patient on check in form reported last Xanax use was 3 days ago.     Patient will contact this office, call 911 or present to the nearest emergency room should suicidal or homicidal ideations occur.    Impression/Formulation:    ICD-10-CM ICD-9-CM   1. Opioid use disorder, severe, on maintenance therapy  F11.20 304.00   2. Methamphetamine use disorder, severe, in early remission  F15.21 304.43   3. Sedative, hypnotic or anxiolytic use disorder, severe, dependence  F13.20 304.10   4. Moderate cannabis use disorder  F12.20 304.30       Clinical Maneuvering/Interventions: Therapist utilized a person-centered approach to build rapport with group member. Therapist implemented motivational interviewing  techniques to assist client with exploring and resolving ambivalence associated with commitment to change behaviors related to substance use and addiction. Therapist applied cognitive behavioral strategies to facilitate identification of maladaptive patterns of thinking and behavior that contribute to client's risk for continued substance use and relapse. Therapist employed group interaction activities to build rapport among group members, promote sobriety, and emphasize relapse prevention. Therapist promoted safe nonjudgmental environment by providing group members with unconditional positive regard and encouraging group members to comply with group rules and guidelines. Therapist assisted group member with identifying and implementing healthier coping strategies.      Plan:  Continue [facility] IOP Phase I   Aftercare:  [facility] Phase II  Program Assignments:  Personal recovery plan, relapse prevention plan, attendance of recovery support group meetings, exploration of sponsorship, drug/alcohol screens.     Andre Zuñiga LCSW  5/4/2025      Part of this note may be an electronic transcription/translation of spoken language to printed text using the Dragon Dictation System.

## 2025-05-05 ENCOUNTER — OFFICE VISIT (OUTPATIENT)
Dept: PSYCHIATRY | Facility: HOSPITAL | Age: 40
End: 2025-05-05
Payer: MEDICAID

## 2025-05-05 DIAGNOSIS — F13.20 SEDATIVE, HYPNOTIC OR ANXIOLYTIC USE DISORDER, SEVERE, DEPENDENCE: ICD-10-CM

## 2025-05-05 DIAGNOSIS — F11.20 OPIOID USE DISORDER, SEVERE, ON MAINTENANCE THERAPY: Primary | ICD-10-CM

## 2025-05-05 DIAGNOSIS — F15.21 METHAMPHETAMINE USE DISORDER, SEVERE, IN EARLY REMISSION: ICD-10-CM

## 2025-05-05 DIAGNOSIS — F12.20 MODERATE CANNABIS USE DISORDER: ICD-10-CM

## 2025-05-05 LAB
HCV GENTYP SERPL NAA+PROBE: NORMAL
HCV GENTYP SERPL NAA+PROBE: NORMAL
HCV RNA SERPL NAA+PROBE-ACNC: NORMAL IU/ML
HCV RNA SERPL NAA+PROBE-LOG IU: 6.07 LOG10 IU/ML
LABORATORY COMMENT REPORT: NORMAL

## 2025-05-05 PROCEDURE — H0015 ALCOHOL AND/OR DRUG SERVICES: HCPCS | Performed by: SOCIAL WORKER

## 2025-05-05 NOTE — PROGRESS NOTES
[facility] IOP GROUP     Date: 05/01/2025  Name: Ema Phan    Time In: 1800   Time Out: Approximately 2051      Number of participants: 7    IOP GROUP NOTE     Services Provided  Group Psychotherapy: 3 hour IOP group therapy session (Check-ins, Coping Skills, Relapse Prevention)  Education and Training:  Activity Therapy:  Individual Therapy:  Family Therapy:  MD Initial:  MD Follow-Up     Check Ins: Therapist continued facilitation of rapport building strategies between group members. Therapist asked that each patient check in with home life and recovery efforts and identify triggers, cravings, and high risk situations that arise between group sessions. Therapist provided empathy and support during group session.     Session Content/Coping Skills: Anjana (Behavioral Health Student/) attended session. Jeanne, Behavioral Health student also attended session. Steve ([facility] Liaison) attended first part of session. [facility], attended second part of session. Individual check-ins completed by group members. Steve reviewed and discussed consequences of addiction psychoeducational material. YouTube video “The Neurobiology of Addiction Addiction 101 in [facility]” viewed ([facility]). Discussion questions related to video completed by group members and answers discussed. Jeopardy activity related to CD-IOP and topics discussed in class completed.      Response: Patient attended class in person. Patient participated in completion of check in form. Patient on check in form answered no to question of “currently or since your last group meeting, have you had any suicidal thoughts or plan or intent to hurt yourself”, and patient also answered no on check in form to question of “currently or since your last group meeting, have you had any homicidal thoughts or plan or intent to hurt others”. Patient on check in form reported utilizing new  supports since last group meeting. Patient on check in form reported concerns with sleep and medication concerns since last group meeting.     Patient reports she is currently taking a half piece of suboxone every couple of days. Patient reports that she told her MD that she is no longer going to be going to her. Clinician reached out to WARREN Giron via epic chat on 5/1 to inquire about scheduling for sublicade. Patient also reports that she needs phone numbers to places where she can do DUI classes.     Personal Assessment 0-10 Scale (0-none, 10-high)    Anxiety:  6   Depression:  8   Cravings: Patient reported for Xanax.      Assessment:     ..  Lab on 04/28/2025   Component Date Value Ref Range Status    THC, Screen, Urine 04/28/2025 Negative  Negative Final    Phencyclidine (PCP), Urine 04/28/2025 Negative  Negative Final    Cocaine Screen, Urine 04/28/2025 Negative  Negative Final    Methamphetamine, Ur 04/28/2025 Negative  Negative Final    Opiate Screen 04/28/2025 Negative  Negative Final    Amphetamine Screen, Urine 04/28/2025 Negative  Negative Final    Benzodiazepine Screen, Urine 04/28/2025 Negative  Negative Final    Tricyclic Antidepressants Screen 04/28/2025 Negative  Negative Final    Methadone Screen, Urine 04/28/2025 Negative  Negative Final    Barbiturates Screen, Urine 04/28/2025 Negative  Negative Final    Oxycodone Screen, Urine 04/28/2025 Negative  Negative Final    Buprenorphine, Screen, Urine 04/28/2025 Negative  Negative Final    6-ACETYLMORPHINE 04/28/2025 Not Detected  10 ng/mL ng/mL Final    AMPHETAMINE 04/28/2025 Not Detected  500 ng/mL ng/mL Final    Barbiturates 04/28/2025 Not Detected  200 ng/mL ng/mL Final    Benzodiazepines 04/28/2025 Not Detected  200 ng/mL ng/mL Final    BUPRENORPHINE 04/28/2025 Not Detected  5 ng/mL ng/mL Final    Cocaine Metabolite 04/28/2025 Not Detected  150 ng/mL ng/mL Final    EDDP 04/28/2025 Not Detected  100 ng/mL ng/mL Final    ALCOHOL, ETHYL 04/28/2025  Not Detected  <100.0000 ng/mL Final    FENTANYL 04/28/2025 Not Detected  1 ng/mL ng/mL Final    METHAMPHETAMINE 04/28/2025 Not Detected  500 ng/mL ng/mL Final    Opiates 04/28/2025 Not Detected  300 ng/mL ng/mL Final    OXYCODONE 04/28/2025 Not Detected  100 ng/mL ng/mL Final    PCP 04/28/2025 Not Detected  25 ng/mL ng/mL Final    THC 04/28/2025 Not Detected  50 ng/mL ng/mL Final    TRICYCLIC ANTIDEPRESSANTS 04/28/2025 Not Detected  300 ng/mL ng/mL Final    Reference Lab Report 04/28/2025    Final    See Full Screen for results.    METHYLPHENIDATE 04/28/2025 Not Detected  10 ng/mL ng/mL Final    BUPRENORPHINE 04/28/2025 Not Detected  10 ng/mL ng/mL Final    NORBUPRENORPHINE 04/28/2025 Not Detected  10 ng/mL ng/mL Final    PREGABALIN 04/28/2025 Not Detected  250 ng/mL ng/mL Final    9-DELTA-THC-COOH 04/28/2025 Not Detected  100 ng/mL ng/mL Final    ZOLPIDEM 04/28/2025 Not Detected  2 ng/mL ng/mL Final    O-Desmethyltramadol 04/28/2025 Not Detected  100 ng/mL ng/mL Final    Tramadol 04/28/2025 Not Detected  50 ng/mL ng/mL Final    GABAPENTIN 04/28/2025 Not Detected  500 ng/mL ng/mL Final   Lab on 04/23/2025   Component Date Value Ref Range Status    THC, Screen, Urine 04/23/2025 Positive (A)  Negative Final    Phencyclidine (PCP), Urine 04/23/2025 Negative  Negative Final    Cocaine Screen, Urine 04/23/2025 Negative  Negative Final    Methamphetamine, Ur 04/23/2025 Negative  Negative Final    Opiate Screen 04/23/2025 Negative  Negative Final    Amphetamine Screen, Urine 04/23/2025 Negative  Negative Final    Benzodiazepine Screen, Urine 04/23/2025 Positive (A)  Negative Final    Tricyclic Antidepressants Screen 04/23/2025 Negative  Negative Final    Methadone Screen, Urine 04/23/2025 Negative  Negative Final    Barbiturates Screen, Urine 04/23/2025 Negative  Negative Final    Oxycodone Screen, Urine 04/23/2025 Negative  Negative Final    Buprenorphine, Screen, Urine 04/23/2025 Positive (A)  Negative Final    Reference  Lab Report 04/23/2025    Final    See Full Screen for results.    GABAPENTIN 04/23/2025 Not Detected  500 ng/mL ng/mL Final    O-Desmethyltramadol 04/23/2025 Not Detected  100 ng/mL ng/mL Final    Tramadol 04/23/2025 Not Detected  50 ng/mL ng/mL Final    ZOLPIDEM 04/23/2025 Not Detected  2 ng/mL ng/mL Final    9-DELTA-THC-COOH 04/23/2025 124^DETECTED  100 ng/mL ng/mL Final    PREGABALIN 04/23/2025 Not Detected  250 ng/mL ng/mL Final    BUPRENORPHINE 04/23/2025 356^DETECTED  10 ng/mL ng/mL Final    NORBUPRENORPHINE 04/23/2025 335^DETECTED  10 ng/mL ng/mL Final    METHYLPHENIDATE 04/23/2025 Not Detected  10 ng/mL ng/mL Final    6-ACETYLMORPHINE 04/23/2025 Not Detected  10 ng/mL ng/mL Final    AMPHETAMINE 04/23/2025 Not Detected  500 ng/mL ng/mL Final    Barbiturates 04/23/2025 Not Detected  200 ng/mL ng/mL Final    Benzodiazepines 04/23/2025 Detected  200 ng/mL ng/mL Final    BUPRENORPHINE 04/23/2025 Detected  5 ng/mL ng/mL Final    Cocaine Metabolite 04/23/2025 Not Detected  150 ng/mL ng/mL Final    EDDP 04/23/2025 Not Detected  100 ng/mL ng/mL Final    ALCOHOL, ETHYL 04/23/2025 Not Detected  <100.0000 ng/mL Final    FENTANYL 04/23/2025 Not Detected  1 ng/mL ng/mL Final    METHAMPHETAMINE 04/23/2025 Not Detected  500 ng/mL ng/mL Final    Opiates 04/23/2025 Not Detected  300 ng/mL ng/mL Final    OXYCODONE 04/23/2025 Not Detected  100 ng/mL ng/mL Final    PCP 04/23/2025 Not Detected  25 ng/mL ng/mL Final    THC 04/23/2025 Detected  50 ng/mL ng/mL Final    TRICYCLIC ANTIDEPRESSANTS 04/23/2025 Not Detected  300 ng/mL ng/mL Final    Reference Lab Report 04/23/2025    Final    See Full Screen for results.    7- AMINOCLONAZEPAM 04/23/2025 Not Detected  50 ng/mL ng/mL Final    ALPHA-HYDROXYALPRAZOLAM 04/23/2025 455^DETECTED  50 ng/mL ng/mL Final    Alprazolam 04/23/2025 175^DETECTED  50 ng/mL ng/mL Final    CLONAZEPAM 04/23/2025 Not Detected  50 ng/mL ng/mL Final    DIAZEPAM 04/23/2025 Not Detected  50 ng/mL ng/mL Final     Flunitrazepam 04/23/2025 Not Detected  50 ng/mL ng/mL Final    FLURAZEPAM 04/23/2025 Not Detected  50 ng/mL ng/mL Final    LORAZEPAM 04/23/2025 Not Detected  100 ng/mL ng/mL Final    Midazolam, Urine 04/23/2025 Not Detected  50 ng/mL ng/mL Final    NORDIAZEPAM 04/23/2025 Not Detected  50 ng/mL ng/mL Final    OXAZEPAM 04/23/2025 Not Detected  50 ng/mL ng/mL Final    TEMAZEPAM 04/23/2025 Not Detected  50 ng/mL ng/mL Final   Lab on 04/14/2025   Component Date Value Ref Range Status    THC, Screen, Urine 04/14/2025 Positive (A)  Negative Final    Phencyclidine (PCP), Urine 04/14/2025 Negative  Negative Final    Cocaine Screen, Urine 04/14/2025 Negative  Negative Final    Methamphetamine, Ur 04/14/2025 Negative  Negative Final    Opiate Screen 04/14/2025 Negative  Negative Final    Amphetamine Screen, Urine 04/14/2025 Negative  Negative Final    Benzodiazepine Screen, Urine 04/14/2025 Positive (A)  Negative Final    Tricyclic Antidepressants Screen 04/14/2025 Positive (A)  Negative Final    Methadone Screen, Urine 04/14/2025 Negative  Negative Final    Barbiturates Screen, Urine 04/14/2025 Negative  Negative Final    Oxycodone Screen, Urine 04/14/2025 Negative  Negative Final    Buprenorphine, Screen, Urine 04/14/2025 Positive (A)  Negative Final    Barbiturates 04/14/2025 Not Detected  200 ng/mL ng/mL Final    EDDP 04/14/2025 Not Detected  100 ng/mL ng/mL Final    6-ACETYLMORPHINE 04/14/2025 Not Detected  10 ng/mL ng/mL Final    AMPHETAMINE 04/14/2025 Not Detected  500 ng/mL ng/mL Final    Benzodiazepines 04/14/2025 Detected  200 ng/mL ng/mL Final    BUPRENORPHINE 04/14/2025 Detected  5 ng/mL ng/mL Final    Cocaine Metabolite 04/14/2025 Not Detected  150 ng/mL ng/mL Final    ALCOHOL, ETHYL 04/14/2025 Not Detected  <100.0000 ng/mL Final    FENTANYL 04/14/2025 Not Detected  1 ng/mL ng/mL Final    METHAMPHETAMINE 04/14/2025 Not Detected  500 ng/mL ng/mL Final    Opiates 04/14/2025 Not Detected  300 ng/mL ng/mL Final     OXYCODONE 04/14/2025 Not Detected  100 ng/mL ng/mL Final    PCP 04/14/2025 Not Detected  25 ng/mL ng/mL Final    THC 04/14/2025 Detected  50 ng/mL ng/mL Final    TRICYCLIC ANTIDEPRESSANTS 04/14/2025 Not Detected  300 ng/mL ng/mL Final    Reference Lab Report 04/14/2025    Final    See Full Screen for results.    METHYLPHENIDATE 04/14/2025 Not Detected  10 ng/mL ng/mL Final    ZOLPIDEM 04/14/2025 Not Detected  2 ng/mL ng/mL Final    O-Desmethyltramadol 04/14/2025 Not Detected  100 ng/mL ng/mL Final    Tramadol 04/14/2025 Not Detected  50 ng/mL ng/mL Final    9-DELTA-THC-COOH 04/14/2025 234^DETECTED  100 ng/mL ng/mL Final    Detected    6-acetylmorphine 04/14/2025 Not Detected  10 ng/mL ng/mL Final    PHENCYCLIDINE 04/14/2025 Not Detected  20 ng/mL ng/mL Final    PREGABALIN 04/14/2025 Not Detected  250 ng/mL ng/mL Final    Oxycodone, Confirmation, Urine 04/14/2025 Not Detected  50 ng/mL ng/mL Final    OXYMORPHONE 04/14/2025 Not Detected  50 ng/mL ng/mL Final    NOROXYCODONE 04/14/2025 Not Detected  50 ng/mL ng/mL Final    METHAMPHETAMINE 04/14/2025 Not Detected  100 ng/mL ng/mL Final    EDDP 04/14/2025 Not Detected  50 ng/mL ng/mL Final    METHADONE 04/14/2025 Not Detected  25 ng/mL ng/mL Final    GABAPENTIN 04/14/2025 Not Detected  500 ng/mL ng/mL Final    BUPRENORPHINE 04/14/2025 265^DETECTED  10 ng/mL ng/mL Final    Detected    NORBUPRENORPHINE 04/14/2025 336^DETECTED  10 ng/mL ng/mL Final    Detected    BENZOYLECGONINE 04/14/2025 Not Detected  100 ng/mL ng/mL Final    PHENTERMINE 04/14/2025 Not Detected  100 ng/mL ng/mL Final    AMPHETAMINE 04/14/2025 Not Detected  250 ng/mL ng/mL Final    FENTANYL 04/14/2025 Not Detected  2 ng/mL ng/mL Final    NORFENTANYL 04/14/2025 Not Detected  10 ng/mL ng/mL Final    7- AMINOCLONAZEPAM 04/14/2025 Not Detected  50 ng/mL ng/mL Final    ALPHA-HYDROXYALPRAZOLAM 04/14/2025 152^DETECTED  50 ng/mL ng/mL Final    Detected    Alprazolam 04/14/2025 55^DETECTED  50 ng/mL ng/mL Final     Detected    CLONAZEPAM 04/14/2025 Not Detected  50 ng/mL ng/mL Final    DIAZEPAM 04/14/2025 Not Detected  50 ng/mL ng/mL Final    Flunitrazepam 04/14/2025 Not Detected  50 ng/mL ng/mL Final    FLURAZEPAM 04/14/2025 Not Detected  50 ng/mL ng/mL Final    LORAZEPAM 04/14/2025 Not Detected  100 ng/mL ng/mL Final    Midazolam, Urine 04/14/2025 Not Detected  50 ng/mL ng/mL Final    NORDIAZEPAM 04/14/2025 Not Detected  50 ng/mL ng/mL Final    OXAZEPAM 04/14/2025 Not Detected  50 ng/mL ng/mL Final    TEMAZEPAM 04/14/2025 Not Detected  50 ng/mL ng/mL Final       Mental Status Exam  Hygiene:  good  Dress: casual  Attitude: cooperative and agreeable   Motor Activity: appropriate  Eye Contact:  good  Speech: regular rate and rhythm   Mood:  calm and cooperative  Affect:  Appropriate  Thought Processes:  Linear  Thought Content:  Normal  Suicidal Thoughts:  denies  Homicidal Thoughts:  denies  Crisis Safety Plan: Safety plan has been discussed.   Hallucinations:  Unknown to clinician.   Reliability: fair  Insight: fair  Judgement: fair  Impulse Control: fair    Recovery/spiritual support group attendance: Patient reports that she recently went to [employer] and .      Progress toward goal: Not at goal    Prognosis: Fair with Ongoing Treatment     Self-reported number of days sober: Patient on check in form reported Jun 13, 24.     Patient will contact this office, call 911 or present to the nearest emergency room should suicidal or homicidal ideations occur.    Impression/Formulation:    ICD-10-CM ICD-9-CM   1. Opioid use disorder, severe, on maintenance therapy  F11.20 304.00   2. Methamphetamine use disorder, severe, in early remission  F15.21 304.43   3. Sedative, hypnotic or anxiolytic use disorder, severe, dependence  F13.20 304.10   4. Moderate cannabis use disorder  F12.20 304.30       Clinical Maneuvering/Interventions: Therapist utilized a person-centered approach to build rapport with group member. Therapist  implemented motivational interviewing techniques to assist client with exploring and resolving ambivalence associated with commitment to change behaviors related to substance use and addiction. Therapist applied cognitive behavioral strategies to facilitate identification of maladaptive patterns of thinking and behavior that contribute to client's risk for continued substance use and relapse. Therapist employed group interaction activities to build rapport among group members, promote sobriety, and emphasize relapse prevention. Therapist promoted safe nonjudgmental environment by providing group members with unconditional positive regard and encouraging group members to comply with group rules and guidelines. Therapist assisted group member with identifying and implementing healthier coping strategies.      Plan:  Continue [facility] IOP Phase I   Aftercare:  [facility] Phase II  Program Assignments:  Personal recovery plan, relapse prevention plan, attendance of recovery support group meetings, exploration of sponsorship, drug/alcohol screens.     Andre Zuñiga LCSW  5/4/2025      Part of this note may be an electronic transcription/translation of spoken language to printed text using the Dragon Dictation System.

## 2025-05-06 LAB
A2 MACROGLOB SERPL-MCNC: 376 MG/DL (ref 110–276)
ALT SERPL W P-5'-P-CCNC: 76 IU/L (ref 0–40)
APO A-I SERPL-MCNC: 124 MG/DL (ref 116–209)
BILIRUB SERPL-MCNC: 0.2 MG/DL (ref 0–1.2)
FIBROSIS SCORING:: ABNORMAL
FIBROSIS STAGE SERPL QL: ABNORMAL
GGT SERPL-CCNC: 151 IU/L (ref 0–60)
HAPTOGLOB SERPL-MCNC: 70 MG/DL (ref 42–296)
HCV AB SER QL: ABNORMAL
LABORATORY COMMENT REPORT: ABNORMAL
LIVER FIBR SCORE SERPL CALC.FIBROSURE: 0.52 (ref 0–0.21)
NECROINFLAMM ACTIVITY SCORING:: ABNORMAL
NECROINFLAMMATORY ACT GRADE SERPL QL: ABNORMAL
NECROINFLAMMATORY ACT SCORE SERPL: 0.54 (ref 0–0.17)
SERVICE CMNT-IMP: ABNORMAL
TEST PERFORMANCE INFO SPEC: ABNORMAL

## 2025-05-07 ENCOUNTER — OFFICE VISIT (OUTPATIENT)
Dept: PSYCHIATRY | Facility: HOSPITAL | Age: 40
End: 2025-05-07
Payer: MEDICAID

## 2025-05-07 DIAGNOSIS — F15.21 METHAMPHETAMINE USE DISORDER, SEVERE, IN EARLY REMISSION: ICD-10-CM

## 2025-05-07 DIAGNOSIS — F12.20 MODERATE CANNABIS USE DISORDER: ICD-10-CM

## 2025-05-07 DIAGNOSIS — F41.1 GENERALIZED ANXIETY DISORDER: ICD-10-CM

## 2025-05-07 DIAGNOSIS — F31.81 BIPOLAR 2 DISORDER: ICD-10-CM

## 2025-05-07 DIAGNOSIS — F11.20 OPIOID USE DISORDER, SEVERE, ON MAINTENANCE THERAPY: Primary | ICD-10-CM

## 2025-05-07 DIAGNOSIS — F13.20 SEDATIVE, HYPNOTIC OR ANXIOLYTIC USE DISORDER, SEVERE, DEPENDENCE: ICD-10-CM

## 2025-05-07 PROCEDURE — H0015 ALCOHOL AND/OR DRUG SERVICES: HCPCS | Performed by: SOCIAL WORKER

## 2025-05-07 RX ORDER — QUETIAPINE FUMARATE 100 MG/1
100 TABLET, FILM COATED ORAL
Qty: 30 TABLET | Refills: 0 | OUTPATIENT
Start: 2025-05-07

## 2025-05-08 ENCOUNTER — LAB (OUTPATIENT)
Dept: LAB | Facility: HOSPITAL | Age: 40
End: 2025-05-08
Payer: MEDICAID

## 2025-05-08 ENCOUNTER — OFFICE VISIT (OUTPATIENT)
Dept: PSYCHIATRY | Facility: HOSPITAL | Age: 40
End: 2025-05-08
Payer: MEDICAID

## 2025-05-08 DIAGNOSIS — F13.20 SEDATIVE, HYPNOTIC OR ANXIOLYTIC USE DISORDER, SEVERE, DEPENDENCE: ICD-10-CM

## 2025-05-08 DIAGNOSIS — F12.20 CANNABIS USE DISORDER, MODERATE, DEPENDENCE: ICD-10-CM

## 2025-05-08 DIAGNOSIS — F12.20 MODERATE CANNABIS USE DISORDER: ICD-10-CM

## 2025-05-08 DIAGNOSIS — F15.21 METHAMPHETAMINE USE DISORDER, SEVERE, IN EARLY REMISSION: ICD-10-CM

## 2025-05-08 DIAGNOSIS — F11.20 OPIOID USE DISORDER, SEVERE, ON MAINTENANCE THERAPY: ICD-10-CM

## 2025-05-08 DIAGNOSIS — F11.20 OPIOID USE DISORDER, SEVERE, ON MAINTENANCE THERAPY: Primary | ICD-10-CM

## 2025-05-08 LAB
AMPHET+METHAMPHET UR QL: NEGATIVE
AMPHETAMINES UR QL: NEGATIVE
BARBITURATES UR QL SCN: NEGATIVE
BENZODIAZ UR QL SCN: NEGATIVE
BUPRENORPHINE SERPL-MCNC: POSITIVE NG/ML
CANNABINOIDS SERPL QL: POSITIVE
COCAINE UR QL: NEGATIVE
METHADONE UR QL SCN: NEGATIVE
OPIATES UR QL: NEGATIVE
OXYCODONE UR QL SCN: NEGATIVE
PCP UR QL SCN: NEGATIVE
TRICYCLICS UR QL SCN: NEGATIVE

## 2025-05-08 PROCEDURE — H0015 ALCOHOL AND/OR DRUG SERVICES: HCPCS | Performed by: SOCIAL WORKER

## 2025-05-08 PROCEDURE — 80306 DRUG TEST PRSMV INSTRMNT: CPT

## 2025-05-09 ENCOUNTER — TELEPHONE (OUTPATIENT)
Dept: GASTROENTEROLOGY | Facility: CLINIC | Age: 40
End: 2025-05-09

## 2025-05-09 DIAGNOSIS — B18.2 CHRONIC HEPATITIS C WITHOUT HEPATIC COMA: Primary | ICD-10-CM

## 2025-05-09 RX ORDER — GLECAPREVIR AND PIBRENTASVIR 40; 100 MG/1; MG/1
3 TABLET, FILM COATED ORAL DAILY
Start: 2025-05-09 | End: 2025-06-06 | Stop reason: SDUPTHER

## 2025-05-09 NOTE — TELEPHONE ENCOUNTER
She has chronic Hepatitis C infection, genotype 1a, treatment naive, without cirrhosis (F2). I have prescribed Mavyret 3 tabs PO once daily x 8 weeks for Hep C treatment.     She is immune to Hep A but not Hep B, needs Hep B vaccination series

## 2025-05-10 LAB — REF LAB TEST METHOD: NORMAL

## 2025-05-10 NOTE — PROGRESS NOTES
[facility] IOP GROUP     Date: 05/08/2025  Name: Ema Phan    Time In: 1800   Time Out: 2054     Number of participants: 5    IOP GROUP NOTE     Services Provided  Group Psychotherapy: 3 hour IOP group therapy session (Check-ins, Coping Skills, Relapse Prevention)  Education and Training:  Activity Therapy:  Individual Therapy:  Family Therapy:  MD Initial:  MD Follow-Up     Check Ins: Therapist continued facilitation of rapport building strategies between group members. Therapist asked that each patient check in with home life and recovery efforts and identify triggers, cravings, and high risk situations that arise between group sessions. Therapist provided empathy and support during group session.     Session Content/Coping Skills: Anjana (Behavioral Health Student/) attended session. Markie (Behavioral Health Students) also attended session. Anjana facilitated review of self-esteem-affirmation development psychoeducational material (retrieved from taking the [email or web address]). Discussion of material was facilitated. Clinician facilitated a discussion with group members of topic if money was not an option, what job would they like to have. Clinician highlighted values that were shown in this discussion. Individual treatment plans were reviewed and signed by group members. Group members developed relapse prevention plans. Therapist Aid drawing activity completed by group members. Clinician processed activity with group members and facilitated discussion of communication and how this skill is sometimes difficult.     Response: Patient attended class in person. Patient participated in completion of check in form. Patient on check in form answered no to question of “currently or since your last group meeting, have you had any suicidal thoughts or plan or intent to hurt yourself”, and patient also answered no on check in form to question of “currently or since your last group meeting,  have you had any homicidal thoughts or plan or intent to hurt others”.     Patient discussed that she is now prescribed Prozac at [facility]'s office. Patient also reports she started suboxone strips on the date of session and has follow up in two weeks. Patient had medication with her which showed she takes 8mg/2 mg strips.     Personal Assessment 0-10 Scale (0-none, 10-high)    Anxiety:  5   Depression:  7   Cravings: Patient did not record answer on check in form.      Assessment:     ..  Lab on 05/08/2025   Component Date Value Ref Range Status    THC, Screen, Urine 05/08/2025 Positive (A)  Negative Final    Phencyclidine (PCP), Urine 05/08/2025 Negative  Negative Final    Cocaine Screen, Urine 05/08/2025 Negative  Negative Final    Methamphetamine, Ur 05/08/2025 Negative  Negative Final    Opiate Screen 05/08/2025 Negative  Negative Final    Amphetamine Screen, Urine 05/08/2025 Negative  Negative Final    Benzodiazepine Screen, Urine 05/08/2025 Negative  Negative Final    Tricyclic Antidepressants Screen 05/08/2025 Negative  Negative Final    Methadone Screen, Urine 05/08/2025 Negative  Negative Final    Barbiturates Screen, Urine 05/08/2025 Negative  Negative Final    Oxycodone Screen, Urine 05/08/2025 Negative  Negative Final    Buprenorphine, Screen, Urine 05/08/2025 Positive (A)  Negative Final   Lab on 05/02/2025   Component Date Value Ref Range Status    Protime 05/02/2025 13.7  12.3 - 15.1 Seconds Final    INR 05/02/2025 0.98  0.90 - 1.10 Final    WBC 05/02/2025 6.83  3.40 - 10.80 10*3/mm3 Final    RBC 05/02/2025 4.31  3.77 - 5.28 10*6/mm3 Final    Hemoglobin 05/02/2025 12.8  12.0 - 15.9 g/dL Final    Hematocrit 05/02/2025 38.7  34.0 - 46.6 % Final    MCV 05/02/2025 89.8  79.0 - 97.0 fL Final    MCH 05/02/2025 29.7  26.6 - 33.0 pg Final    MCHC 05/02/2025 33.1  31.5 - 35.7 g/dL Final    RDW 05/02/2025 13.2  12.3 - 15.4 % Final    RDW-SD 05/02/2025 43.3  37.0 - 54.0 fl Final    MPV 05/02/2025 11.9  6.0 -  12.0 fL Final    Platelets 05/02/2025 116 (L)  140 - 450 10*3/mm3 Final    Glucose 05/02/2025 79  65 - 99 mg/dL Final    BUN 05/02/2025 6  6 - 20 mg/dL Final    Creatinine 05/02/2025 0.69  0.57 - 1.00 mg/dL Final    Sodium 05/02/2025 136  136 - 145 mmol/L Final    Potassium 05/02/2025 3.8  3.5 - 5.2 mmol/L Final    Chloride 05/02/2025 105  98 - 107 mmol/L Final    CO2 05/02/2025 20.9 (L)  22.0 - 29.0 mmol/L Final    Calcium 05/02/2025 9.0  8.6 - 10.5 mg/dL Final    Total Protein 05/02/2025 7.3  6.0 - 8.5 g/dL Final    Albumin 05/02/2025 3.9  3.5 - 5.2 g/dL Final    ALT (SGPT) 05/02/2025 65 (H)  1 - 33 U/L Final    AST (SGOT) 05/02/2025 85 (H)  1 - 32 U/L Final    Alkaline Phosphatase 05/02/2025 135 (H)  39 - 117 U/L Final    Total Bilirubin 05/02/2025 0.3  0.0 - 1.2 mg/dL Final    Globulin 05/02/2025 3.4  gm/dL Final    A/G Ratio 05/02/2025 1.1  g/dL Final    BUN/Creatinine Ratio 05/02/2025 8.7  7.0 - 25.0 Final    Anion Gap 05/02/2025 10.1  5.0 - 15.0 mmol/L Final    eGFR 05/02/2025 112.0  >60.0 mL/min/1.73 Final    Fibrosis Score 05/02/2025 0.52 (H)  0.00 - 0.21 Final    Fibrosis Stage 05/02/2025 Comment   Final                F2 - Bridging fibrosis with few septa    Necroinflammat Activity Score 05/02/2025 0.54 (H)  0.00 - 0.17 Final    Necroinflammat Activity Grade 05/02/2025 A2-Moderate activity   Final    Methodology: 05/02/2025 Comment   Final    The analytes tested are performed by [employer]roSure-Specific methods.  Not intended for use with other diagnostic considerations.    Alpha 2-Macroglobulins, Qn 05/02/2025 376 (H)  110 - 276 mg/dL Final    Haptoglobin 05/02/2025 70  42 - 296 mg/dL Final    Apolipoprotein A-1 05/02/2025 124  116 - 209 mg/dL Final    Total Bilirubin 05/02/2025 0.2  0.0 - 1.2 mg/dL Final    GGT 05/02/2025 151 (H)  0 - 60 IU/L Final    ALT (SGPT) 05/02/2025 76 (H)  0 - 40 IU/L Final    HCV Qual Interp 05/02/2025 Comment   Final    Quantitative results of 6 biochemical tests are analyzed  using  a computational algorithm to provide a quantitative surrogate  marker (0.0-1.0) for liver fibrosis (METAVIR F0-F4) and for  necroinflammatory activity (METAVIR A0-A3).    Fibrosis Scorin2025 Comment   Final         <=0.21 = Stage F0 - No fibrosis  0.21 - 0.27 = Stage F0 - F1  0.27 - 0.31 = Stage F1 - Portal fibrosis  0.31 - 0.48 = Stage F1 - F2  0.48 - 0.58 = Stage F2 - Bridging fibrosis with few septa  0.58 - 0.72 = Stage F3 - Bridging fibrosis with many septa  0.72 - 0.74 = Stage F3 - F4        >0.74 = Stage F4 - Cirrhosis    Necroinflamm Activity Scorin2025 Comment   Final          <0.17 = Grade A0 - No Activity  0.17 - 0.29 = Grade A0 - A1  0.29 - 0.36 = Grade A1 - Minimal activity  0.36 - 0.52 = Grade A1 - A2  0.52 - 0.60 = Grade A2 - Moderate activity  0.60 - 0.62 = Grade A2 - A3        >0.62 = Grade A3 - Severe activity    Limitations: 2025 Comment   Final    The negative predictive value of a Fibrotest score <0.31 (absence of  clinically significant fibrosis) was 85% when compared to liver biopsy  in 1,270 HCV infected patients with a 38% prevalence of significant  liver fibrosis (F2, 3 or 4). The positive predictive value of a Fibro-  test score >0.48 (F2, 3, 4) was 61% in that same patient cohort. HCV  FibroSURE is not recommended in patients with Gilbert Disease, acute  hemolysis (e.g. HCV ribavirin therapy mediated hemolysis) acute hepa-  titis of the liver, extra-hepatic cholestasis, transplant patients,  and/or renal insufficiency patients.  Any of these clinical situations  may lead to inaccurate quantitative predictions of fibrosis and  necroinflammatory activity in the liver.    Comment 2025 Comment   Final    This test was developed and its performance characteristics determined  by [employer].  It has not been cleared or approved by the Food and Drug  Administration.  The FDA has determined that such clearance or  approval is not necessary.  For questions  regarding this report please contact customer service  at [phone].    Hepatitis C Quantitation 05/02/2025 [id number]  IU/mL Final    HCV log10 05/02/2025 6.068  log10 IU/mL Final    HCV Test Information 05/02/2025 Comment   Final    The quantitative range of this assay is 15 IU/mL to 100 million IU/mL.    HCV Genotype 05/02/2025 Comment   Final    To be performed on this specimen.    Hep A Total Ab 05/02/2025 Positive (A)  Negative Final    Comment: The HAV total antibody assay detects both IgG and IgM  but does not differentiate between them. A negative result  suggests susceptibility to infection. A positive result could  be due to vaccination, previously resolved infection or active  infection. Testing for HAV IgM should be performed if active  HAV infection is suspected. [employer] offers profiles that will  automatically reflex positive HAV total antibody results to  IgM (e.g., panel #[id number] HAV Antibody w/ Rfx).    Hep B Core Total Ab 05/02/2025 Positive (A)  Negative Final    Hepatitis B Surface Ag 05/02/2025 Non-Reactive  Non-Reactive Final    Hep B S Ab 05/02/2025 Non-Reactive   Final    HIV-1/ HIV-2 Ab 05/02/2025 Non-Reactive  Non-Reactive Final    HIV-1 P24 Ag Screen 05/02/2025 Non-Reactive  Non-Reactive Final    Hepatitis C Genotype 05/02/2025 1a   Final   Lab on 04/28/2025   Component Date Value Ref Range Status    THC, Screen, Urine 04/28/2025 Negative  Negative Final    Phencyclidine (PCP), Urine 04/28/2025 Negative  Negative Final    Cocaine Screen, Urine 04/28/2025 Negative  Negative Final    Methamphetamine, Ur 04/28/2025 Negative  Negative Final    Opiate Screen 04/28/2025 Negative  Negative Final    Amphetamine Screen, Urine 04/28/2025 Negative  Negative Final    Benzodiazepine Screen, Urine 04/28/2025 Negative  Negative Final    Tricyclic Antidepressants Screen 04/28/2025 Negative  Negative Final    Methadone Screen, Urine 04/28/2025 Negative  Negative Final    Barbiturates Screen, Urine  04/28/2025 Negative  Negative Final    Oxycodone Screen, Urine 04/28/2025 Negative  Negative Final    Buprenorphine, Screen, Urine 04/28/2025 Negative  Negative Final    6-ACETYLMORPHINE 04/28/2025 Not Detected  10 ng/mL ng/mL Final    AMPHETAMINE 04/28/2025 Not Detected  500 ng/mL ng/mL Final    Barbiturates 04/28/2025 Not Detected  200 ng/mL ng/mL Final    Benzodiazepines 04/28/2025 Not Detected  200 ng/mL ng/mL Final    BUPRENORPHINE 04/28/2025 Not Detected  5 ng/mL ng/mL Final    Cocaine Metabolite 04/28/2025 Not Detected  150 ng/mL ng/mL Final    EDDP 04/28/2025 Not Detected  100 ng/mL ng/mL Final    ALCOHOL, ETHYL 04/28/2025 Not Detected  <100.0000 ng/mL Final    FENTANYL 04/28/2025 Not Detected  1 ng/mL ng/mL Final    METHAMPHETAMINE 04/28/2025 Not Detected  500 ng/mL ng/mL Final    Opiates 04/28/2025 Not Detected  300 ng/mL ng/mL Final    OXYCODONE 04/28/2025 Not Detected  100 ng/mL ng/mL Final    PCP 04/28/2025 Not Detected  25 ng/mL ng/mL Final    THC 04/28/2025 Not Detected  50 ng/mL ng/mL Final    TRICYCLIC ANTIDEPRESSANTS 04/28/2025 Not Detected  300 ng/mL ng/mL Final    Reference Lab Report 04/28/2025    Final    See Full Screen for results.    METHYLPHENIDATE 04/28/2025 Not Detected  10 ng/mL ng/mL Final    BUPRENORPHINE 04/28/2025 Not Detected  10 ng/mL ng/mL Final    NORBUPRENORPHINE 04/28/2025 Not Detected  10 ng/mL ng/mL Final    PREGABALIN 04/28/2025 Not Detected  250 ng/mL ng/mL Final    9-DELTA-THC-COOH 04/28/2025 Not Detected  100 ng/mL ng/mL Final    ZOLPIDEM 04/28/2025 Not Detected  2 ng/mL ng/mL Final    O-Desmethyltramadol 04/28/2025 Not Detected  100 ng/mL ng/mL Final    Tramadol 04/28/2025 Not Detected  50 ng/mL ng/mL Final    GABAPENTIN 04/28/2025 Not Detected  500 ng/mL ng/mL Final   Lab on 04/23/2025   Component Date Value Ref Range Status    THC, Screen, Urine 04/23/2025 Positive (A)  Negative Final    Phencyclidine (PCP), Urine 04/23/2025 Negative  Negative Final    Cocaine Screen,  Urine 04/23/2025 Negative  Negative Final    Methamphetamine, Ur 04/23/2025 Negative  Negative Final    Opiate Screen 04/23/2025 Negative  Negative Final    Amphetamine Screen, Urine 04/23/2025 Negative  Negative Final    Benzodiazepine Screen, Urine 04/23/2025 Positive (A)  Negative Final    Tricyclic Antidepressants Screen 04/23/2025 Negative  Negative Final    Methadone Screen, Urine 04/23/2025 Negative  Negative Final    Barbiturates Screen, Urine 04/23/2025 Negative  Negative Final    Oxycodone Screen, Urine 04/23/2025 Negative  Negative Final    Buprenorphine, Screen, Urine 04/23/2025 Positive (A)  Negative Final    Reference Lab Report 04/23/2025    Final    See Full Screen for results.    GABAPENTIN 04/23/2025 Not Detected  500 ng/mL ng/mL Final    O-Desmethyltramadol 04/23/2025 Not Detected  100 ng/mL ng/mL Final    Tramadol 04/23/2025 Not Detected  50 ng/mL ng/mL Final    ZOLPIDEM 04/23/2025 Not Detected  2 ng/mL ng/mL Final    9-DELTA-THC-COOH 04/23/2025 124^DETECTED  100 ng/mL ng/mL Final    PREGABALIN 04/23/2025 Not Detected  250 ng/mL ng/mL Final    BUPRENORPHINE 04/23/2025 356^DETECTED  10 ng/mL ng/mL Final    NORBUPRENORPHINE 04/23/2025 335^DETECTED  10 ng/mL ng/mL Final    METHYLPHENIDATE 04/23/2025 Not Detected  10 ng/mL ng/mL Final    6-ACETYLMORPHINE 04/23/2025 Not Detected  10 ng/mL ng/mL Final    AMPHETAMINE 04/23/2025 Not Detected  500 ng/mL ng/mL Final    Barbiturates 04/23/2025 Not Detected  200 ng/mL ng/mL Final    Benzodiazepines 04/23/2025 Detected  200 ng/mL ng/mL Final    BUPRENORPHINE 04/23/2025 Detected  5 ng/mL ng/mL Final    Cocaine Metabolite 04/23/2025 Not Detected  150 ng/mL ng/mL Final    EDDP 04/23/2025 Not Detected  100 ng/mL ng/mL Final    ALCOHOL, ETHYL 04/23/2025 Not Detected  <100.0000 ng/mL Final    FENTANYL 04/23/2025 Not Detected  1 ng/mL ng/mL Final    METHAMPHETAMINE 04/23/2025 Not Detected  500 ng/mL ng/mL Final    Opiates 04/23/2025 Not Detected  300 ng/mL ng/mL  Final    OXYCODONE 04/23/2025 Not Detected  100 ng/mL ng/mL Final    PCP 04/23/2025 Not Detected  25 ng/mL ng/mL Final    THC 04/23/2025 Detected  50 ng/mL ng/mL Final    TRICYCLIC ANTIDEPRESSANTS 04/23/2025 Not Detected  300 ng/mL ng/mL Final    Reference Lab Report 04/23/2025    Final    See Full Screen for results.    7- AMINOCLONAZEPAM 04/23/2025 Not Detected  50 ng/mL ng/mL Final    ALPHA-HYDROXYALPRAZOLAM 04/23/2025 455^DETECTED  50 ng/mL ng/mL Final    Alprazolam 04/23/2025 175^DETECTED  50 ng/mL ng/mL Final    CLONAZEPAM 04/23/2025 Not Detected  50 ng/mL ng/mL Final    DIAZEPAM 04/23/2025 Not Detected  50 ng/mL ng/mL Final    Flunitrazepam 04/23/2025 Not Detected  50 ng/mL ng/mL Final    FLURAZEPAM 04/23/2025 Not Detected  50 ng/mL ng/mL Final    LORAZEPAM 04/23/2025 Not Detected  100 ng/mL ng/mL Final    Midazolam, Urine 04/23/2025 Not Detected  50 ng/mL ng/mL Final    NORDIAZEPAM 04/23/2025 Not Detected  50 ng/mL ng/mL Final    OXAZEPAM 04/23/2025 Not Detected  50 ng/mL ng/mL Final    TEMAZEPAM 04/23/2025 Not Detected  50 ng/mL ng/mL Final       Mental Status Exam  Hygiene:  good  Dress: casual  Attitude: cooperative and agreeable   Motor Activity: appropriate  Eye Contact:  good  Speech: regular rate and rhythm   Mood:  calm and cooperative  Affect:  Appropriate  Thought Processes:  Linear  Thought Content:  Normal  Suicidal Thoughts:  denies  Homicidal Thoughts:  denies  Crisis Safety Plan: Safety plan has been discussed.   Hallucinations:  Unknown to clinician.   Reliability: fair  Insight: fair  Judgement: fair  Impulse Control: fair    Recovery/spiritual support group attendance: No.      Progress toward goal: Not at goal    Prognosis: Fair with Ongoing Treatment     Self-reported number of days sober: Patient on check in form reported Jun. 13, 24 (Meth).     Patient will contact this office, call 911 or present to the nearest emergency room should suicidal or homicidal ideations  occur.    Impression/Formulation:    ICD-10-CM ICD-9-CM   1. Opioid use disorder, severe, on maintenance therapy  F11.20 304.00   2. Methamphetamine use disorder, severe, in early remission  F15.21 304.43   3. Sedative, hypnotic or anxiolytic use disorder, severe, dependence  F13.20 304.10   4. Moderate cannabis use disorder  F12.20 304.30       Clinical Maneuvering/Interventions: Therapist utilized a person-centered approach to build rapport with group member. Therapist implemented motivational interviewing techniques to assist client with exploring and resolving ambivalence associated with commitment to change behaviors related to substance use and addiction. Therapist applied cognitive behavioral strategies to facilitate identification of maladaptive patterns of thinking and behavior that contribute to client's risk for continued substance use and relapse. Therapist employed group interaction activities to build rapport among group members, promote sobriety, and emphasize relapse prevention. Therapist promoted safe nonjudgmental environment by providing group members with unconditional positive regard and encouraging group members to comply with group rules and guidelines. Therapist assisted group member with identifying and implementing healthier coping strategies.      Plan:  Continue [facility] IOP Phase I   Aftercare:  [facility] Phase II  Program Assignments:  Personal recovery plan, relapse prevention plan, attendance of recovery support group meetings, exploration of sponsorship, drug/alcohol screens.     Andre Zuñiga LCSW  5/10/2025      Part of this note may be an electronic transcription/translation of spoken language to printed text using the Dragon Dictation System.

## 2025-05-10 NOTE — PROGRESS NOTES
[facility] IOP GROUP     Date: 05/07/2025  Name: Ema Phan    Time In: 1800   Time Out: 2057     Number of participants: 6    IOP GROUP NOTE     Services Provided  Group Psychotherapy: 3 hour IOP group therapy session (Check-ins, Coping Skills, Relapse Prevention)  Education and Training:  Activity Therapy:  Individual Therapy:  Family Therapy:  MD Initial:  MD Follow-Up     Check Ins: Therapist continued facilitation of rapport building strategies between group members. Therapist asked that each patient check in with home life and recovery efforts and identify triggers, cravings, and high risk situations that arise between group sessions. Therapist provided empathy and support during group session.     Session Content/Coping Skills: Anjana (Behavioral Health Student/) attended session. Tania (Behavioral Health Student) also attended session. Bev from Pharmacy presented on Narcan Education. Clinician discussed with group members the recent publication showing the decrease in overdose deaths in 2024. Clinician discussed with group members the importance of attendance in [facility] and transportation. Group members were provided with recovery action logs to be completed and brought to each session. Skill Building and Prepare and practice emotional, physical, and spiritual self-care psychoeducational material reviewed and discussed (retrieved from [email or web address]). Group members completed and shared their vision boards representing what they hope their lives look like with sobriety. Group members then gave a shout out to another group member that they feel did something well in this session.     Response: Patient attended class in person. Patient participated in completion of check in form. Patient on check in form answered no to question of “currently or since your last group meeting, have you had any suicidal thoughts or plan or intent to hurt yourself”, and patient also answered no on  check in form to question of “currently or since your last group meeting, have you had any homicidal thoughts or plan or intent to hurt others”.     Patient completed [facility] referral form.     Personal Assessment 0-10 Scale (0-none, 10-high)    Anxiety:  5   Depression:  7   Cravings: Patient did not record answer on check in form.      Assessment:     ..  Lab on 05/02/2025   Component Date Value Ref Range Status    Protime 05/02/2025 13.7  12.3 - 15.1 Seconds Final    INR 05/02/2025 0.98  0.90 - 1.10 Final    WBC 05/02/2025 6.83  3.40 - 10.80 10*3/mm3 Final    RBC 05/02/2025 4.31  3.77 - 5.28 10*6/mm3 Final    Hemoglobin 05/02/2025 12.8  12.0 - 15.9 g/dL Final    Hematocrit 05/02/2025 38.7  34.0 - 46.6 % Final    MCV 05/02/2025 89.8  79.0 - 97.0 fL Final    MCH 05/02/2025 29.7  26.6 - 33.0 pg Final    MCHC 05/02/2025 33.1  31.5 - 35.7 g/dL Final    RDW 05/02/2025 13.2  12.3 - 15.4 % Final    RDW-SD 05/02/2025 43.3  37.0 - 54.0 fl Final    MPV 05/02/2025 11.9  6.0 - 12.0 fL Final    Platelets 05/02/2025 116 (L)  140 - 450 10*3/mm3 Final    Glucose 05/02/2025 79  65 - 99 mg/dL Final    BUN 05/02/2025 6  6 - 20 mg/dL Final    Creatinine 05/02/2025 0.69  0.57 - 1.00 mg/dL Final    Sodium 05/02/2025 136  136 - 145 mmol/L Final    Potassium 05/02/2025 3.8  3.5 - 5.2 mmol/L Final    Chloride 05/02/2025 105  98 - 107 mmol/L Final    CO2 05/02/2025 20.9 (L)  22.0 - 29.0 mmol/L Final    Calcium 05/02/2025 9.0  8.6 - 10.5 mg/dL Final    Total Protein 05/02/2025 7.3  6.0 - 8.5 g/dL Final    Albumin 05/02/2025 3.9  3.5 - 5.2 g/dL Final    ALT (SGPT) 05/02/2025 65 (H)  1 - 33 U/L Final    AST (SGOT) 05/02/2025 85 (H)  1 - 32 U/L Final    Alkaline Phosphatase 05/02/2025 135 (H)  39 - 117 U/L Final    Total Bilirubin 05/02/2025 0.3  0.0 - 1.2 mg/dL Final    Globulin 05/02/2025 3.4  gm/dL Final    A/G Ratio 05/02/2025 1.1  g/dL Final    BUN/Creatinine Ratio 05/02/2025 8.7  7.0 - 25.0 Final    Anion Gap 05/02/2025  10.1  5.0 - 15.0 mmol/L Final    eGFR 2025 112.0  >60.0 mL/min/1.73 Final    Fibrosis Score 2025 0.52 (H)  0.00 - 0.21 Final    Fibrosis Stage 2025 Comment   Final                F2 - Bridging fibrosis with few septa    Necroinflammat Activity Score 2025 0.54 (H)  0.00 - 0.17 Final    Necroinflammat Activity Grade 2025 A2-Moderate activity   Final    Methodology: 2025 Comment   Final    The analytes tested are performed by [employer]roSure-Specific methods.  Not intended for use with other diagnostic considerations.    Alpha 2-Macroglobulins, Qn 2025 376 (H)  110 - 276 mg/dL Final    Haptoglobin 2025 70  42 - 296 mg/dL Final    Apolipoprotein A-1 2025 124  116 - 209 mg/dL Final    Total Bilirubin 2025 0.2  0.0 - 1.2 mg/dL Final    GGT 2025 151 (H)  0 - 60 IU/L Final    ALT (SGPT) 2025 76 (H)  0 - 40 IU/L Final    HCV Qual Interp 2025 Comment   Final    Quantitative results of 6 biochemical tests are analyzed using  a computational algorithm to provide a quantitative surrogate  marker (0.0-1.0) for liver fibrosis (METAVIR F0-F4) and for  necroinflammatory activity (METAVIR A0-A3).    Fibrosis Scorin2025 Comment   Final         <=0.21 = Stage F0 - No fibrosis  0.21 - 0.27 = Stage F0 - F1  0.27 - 0.31 = Stage F1 - Portal fibrosis  0.31 - 0.48 = Stage F1 - F2  0.48 - 0.58 = Stage F2 - Bridging fibrosis with few septa  0.58 - 0.72 = Stage F3 - Bridging fibrosis with many septa  0.72 - 0.74 = Stage F3 - F4        >0.74 = Stage F4 - Cirrhosis    Necroinflamm Activity Scorin2025 Comment   Final          <0.17 = Grade A0 - No Activity  0.17 - 0.29 = Grade A0 - A1  0.29 - 0.36 = Grade A1 - Minimal activity  0.36 - 0.52 = Grade A1 - A2  0.52 - 0.60 = Grade A2 - Moderate activity  0.60 - 0.62 = Grade A2 - A3        >0.62 = Grade A3 - Severe activity    Limitations: 2025 Comment   Final    The negative predictive value of a Fibrotest  score <0.31 (absence of  clinically significant fibrosis) was 85% when compared to liver biopsy  in 1,270 HCV infected patients with a 38% prevalence of significant  liver fibrosis (F2, 3 or 4). The positive predictive value of a Fibro-  test score >0.48 (F2, 3, 4) was 61% in that same patient cohort. HCV  FibroSURE is not recommended in patients with Gilbert Disease, acute  hemolysis (e.g. HCV ribavirin therapy mediated hemolysis) acute hepa-  titis of the liver, extra-hepatic cholestasis, transplant patients,  and/or renal insufficiency patients.  Any of these clinical situations  may lead to inaccurate quantitative predictions of fibrosis and  necroinflammatory activity in the liver.    Comment 05/02/2025 Comment   Final    This test was developed and its performance characteristics determined  by [employer].  It has not been cleared or approved by the Food and Drug  Administration.  The FDA has determined that such clearance or  approval is not necessary.  For questions regarding this report please contact customer service  at [phone].    Hepatitis C Quantitation 05/02/2025 2201628  IU/mL Final    HCV log10 05/02/2025 6.068  log10 IU/mL Final    HCV Test Information 05/02/2025 Comment   Final    The quantitative range of this assay is 15 IU/mL to 100 million IU/mL.    HCV Genotype 05/02/2025 Comment   Final    To be performed on this specimen.    Hep A Total Ab 05/02/2025 Positive (A)  Negative Final    Comment: The HAV total antibody assay detects both IgG and IgM  but does not differentiate between them. A negative result  suggests susceptibility to infection. A positive result could  be due to vaccination, previously resolved infection or active  infection. Testing for HAV IgM should be performed if active  HAV infection is suspected. [employer] offers profiles that will  automatically reflex positive HAV total antibody results to  IgM (e.g., panel #[id number] HAV Antibody w/ Rfx).    Hep B Core Total Ab  05/02/2025 Positive (A)  Negative Final    Hepatitis B Surface Ag 05/02/2025 Non-Reactive  Non-Reactive Final    Hep B S Ab 05/02/2025 Non-Reactive   Final    HIV-1/ HIV-2 Ab 05/02/2025 Non-Reactive  Non-Reactive Final    HIV-1 P24 Ag Screen 05/02/2025 Non-Reactive  Non-Reactive Final    Hepatitis C Genotype 05/02/2025 1a   Final   Lab on 04/28/2025   Component Date Value Ref Range Status    THC, Screen, Urine 04/28/2025 Negative  Negative Final    Phencyclidine (PCP), Urine 04/28/2025 Negative  Negative Final    Cocaine Screen, Urine 04/28/2025 Negative  Negative Final    Methamphetamine, Ur 04/28/2025 Negative  Negative Final    Opiate Screen 04/28/2025 Negative  Negative Final    Amphetamine Screen, Urine 04/28/2025 Negative  Negative Final    Benzodiazepine Screen, Urine 04/28/2025 Negative  Negative Final    Tricyclic Antidepressants Screen 04/28/2025 Negative  Negative Final    Methadone Screen, Urine 04/28/2025 Negative  Negative Final    Barbiturates Screen, Urine 04/28/2025 Negative  Negative Final    Oxycodone Screen, Urine 04/28/2025 Negative  Negative Final    Buprenorphine, Screen, Urine 04/28/2025 Negative  Negative Final    6-ACETYLMORPHINE 04/28/2025 Not Detected  10 ng/mL ng/mL Final    AMPHETAMINE 04/28/2025 Not Detected  500 ng/mL ng/mL Final    Barbiturates 04/28/2025 Not Detected  200 ng/mL ng/mL Final    Benzodiazepines 04/28/2025 Not Detected  200 ng/mL ng/mL Final    BUPRENORPHINE 04/28/2025 Not Detected  5 ng/mL ng/mL Final    Cocaine Metabolite 04/28/2025 Not Detected  150 ng/mL ng/mL Final    EDDP 04/28/2025 Not Detected  100 ng/mL ng/mL Final    ALCOHOL, ETHYL 04/28/2025 Not Detected  <100.0000 ng/mL Final    FENTANYL 04/28/2025 Not Detected  1 ng/mL ng/mL Final    METHAMPHETAMINE 04/28/2025 Not Detected  500 ng/mL ng/mL Final    Opiates 04/28/2025 Not Detected  300 ng/mL ng/mL Final    OXYCODONE 04/28/2025 Not Detected  100 ng/mL ng/mL Final    PCP 04/28/2025 Not Detected  25 ng/mL ng/mL  Final    THC 04/28/2025 Not Detected  50 ng/mL ng/mL Final    TRICYCLIC ANTIDEPRESSANTS 04/28/2025 Not Detected  300 ng/mL ng/mL Final    Reference Lab Report 04/28/2025    Final    See Full Screen for results.    METHYLPHENIDATE 04/28/2025 Not Detected  10 ng/mL ng/mL Final    BUPRENORPHINE 04/28/2025 Not Detected  10 ng/mL ng/mL Final    NORBUPRENORPHINE 04/28/2025 Not Detected  10 ng/mL ng/mL Final    PREGABALIN 04/28/2025 Not Detected  250 ng/mL ng/mL Final    9-DELTA-THC-COOH 04/28/2025 Not Detected  100 ng/mL ng/mL Final    ZOLPIDEM 04/28/2025 Not Detected  2 ng/mL ng/mL Final    O-Desmethyltramadol 04/28/2025 Not Detected  100 ng/mL ng/mL Final    Tramadol 04/28/2025 Not Detected  50 ng/mL ng/mL Final    GABAPENTIN 04/28/2025 Not Detected  500 ng/mL ng/mL Final   Lab on 04/23/2025   Component Date Value Ref Range Status    THC, Screen, Urine 04/23/2025 Positive (A)  Negative Final    Phencyclidine (PCP), Urine 04/23/2025 Negative  Negative Final    Cocaine Screen, Urine 04/23/2025 Negative  Negative Final    Methamphetamine, Ur 04/23/2025 Negative  Negative Final    Opiate Screen 04/23/2025 Negative  Negative Final    Amphetamine Screen, Urine 04/23/2025 Negative  Negative Final    Benzodiazepine Screen, Urine 04/23/2025 Positive (A)  Negative Final    Tricyclic Antidepressants Screen 04/23/2025 Negative  Negative Final    Methadone Screen, Urine 04/23/2025 Negative  Negative Final    Barbiturates Screen, Urine 04/23/2025 Negative  Negative Final    Oxycodone Screen, Urine 04/23/2025 Negative  Negative Final    Buprenorphine, Screen, Urine 04/23/2025 Positive (A)  Negative Final    Reference Lab Report 04/23/2025    Final    See Full Screen for results.    GABAPENTIN 04/23/2025 Not Detected  500 ng/mL ng/mL Final    O-Desmethyltramadol 04/23/2025 Not Detected  100 ng/mL ng/mL Final    Tramadol 04/23/2025 Not Detected  50 ng/mL ng/mL Final    ZOLPIDEM 04/23/2025 Not Detected  2 ng/mL ng/mL Final     1-DURCB-SZU-COOH 04/23/2025 124^DETECTED  100 ng/mL ng/mL Final    PREGABALIN 04/23/2025 Not Detected  250 ng/mL ng/mL Final    BUPRENORPHINE 04/23/2025 356^DETECTED  10 ng/mL ng/mL Final    NORBUPRENORPHINE 04/23/2025 335^DETECTED  10 ng/mL ng/mL Final    METHYLPHENIDATE 04/23/2025 Not Detected  10 ng/mL ng/mL Final    6-ACETYLMORPHINE 04/23/2025 Not Detected  10 ng/mL ng/mL Final    AMPHETAMINE 04/23/2025 Not Detected  500 ng/mL ng/mL Final    Barbiturates 04/23/2025 Not Detected  200 ng/mL ng/mL Final    Benzodiazepines 04/23/2025 Detected  200 ng/mL ng/mL Final    BUPRENORPHINE 04/23/2025 Detected  5 ng/mL ng/mL Final    Cocaine Metabolite 04/23/2025 Not Detected  150 ng/mL ng/mL Final    EDDP 04/23/2025 Not Detected  100 ng/mL ng/mL Final    ALCOHOL, ETHYL 04/23/2025 Not Detected  <100.0000 ng/mL Final    FENTANYL 04/23/2025 Not Detected  1 ng/mL ng/mL Final    METHAMPHETAMINE 04/23/2025 Not Detected  500 ng/mL ng/mL Final    Opiates 04/23/2025 Not Detected  300 ng/mL ng/mL Final    OXYCODONE 04/23/2025 Not Detected  100 ng/mL ng/mL Final    PCP 04/23/2025 Not Detected  25 ng/mL ng/mL Final    THC 04/23/2025 Detected  50 ng/mL ng/mL Final    TRICYCLIC ANTIDEPRESSANTS 04/23/2025 Not Detected  300 ng/mL ng/mL Final    Reference Lab Report 04/23/2025    Final    See Full Screen for results.    7- AMINOCLONAZEPAM 04/23/2025 Not Detected  50 ng/mL ng/mL Final    ALPHA-HYDROXYALPRAZOLAM 04/23/2025 455^DETECTED  50 ng/mL ng/mL Final    Alprazolam 04/23/2025 175^DETECTED  50 ng/mL ng/mL Final    CLONAZEPAM 04/23/2025 Not Detected  50 ng/mL ng/mL Final    DIAZEPAM 04/23/2025 Not Detected  50 ng/mL ng/mL Final    Flunitrazepam 04/23/2025 Not Detected  50 ng/mL ng/mL Final    FLURAZEPAM 04/23/2025 Not Detected  50 ng/mL ng/mL Final    LORAZEPAM 04/23/2025 Not Detected  100 ng/mL ng/mL Final    Midazolam, Urine 04/23/2025 Not Detected  50 ng/mL ng/mL Final    NORDIAZEPAM 04/23/2025 Not Detected  50 ng/mL ng/mL Final     OXAZEPAM 04/23/2025 Not Detected  50 ng/mL ng/mL Final    TEMAZEPAM 04/23/2025 Not Detected  50 ng/mL ng/mL Final       Mental Status Exam  Hygiene:  good  Dress: casual  Attitude: cooperative and agreeable   Motor Activity: appropriate  Eye Contact:  good  Speech: regular rate and rhythm   Mood:  calm and cooperative  Affect:  Appropriate  Thought Processes:  Linear  Thought Content:  Normal  Suicidal Thoughts:  denies  Homicidal Thoughts:  denies  Crisis Safety Plan: Safety plan has been discussed.   Hallucinations:  Unknown to clinician.   Reliability: fair  Insight: fair  Judgement: fair  Impulse Control: fair    Recovery/spiritual support group attendance: No.      Progress toward goal: Not at goal    Prognosis: Fair with Ongoing Treatment     Self-reported number of days sober: Patient on check in form reported Jun 13, 24. Patient reported last Xanax use was about a week or longer.     Patient will contact this office, call 911 or present to the nearest emergency room should suicidal or homicidal ideations occur.    Impression/Formulation:    ICD-10-CM ICD-9-CM   1. Opioid use disorder, severe, on maintenance therapy  F11.20 304.00   2. Methamphetamine use disorder, severe, in early remission  F15.21 304.43   3. Sedative, hypnotic or anxiolytic use disorder, severe, dependence  F13.20 304.10   4. Moderate cannabis use disorder  F12.20 304.30       Clinical Maneuvering/Interventions: Therapist utilized a person-centered approach to build rapport with group member. Therapist implemented motivational interviewing techniques to assist client with exploring and resolving ambivalence associated with commitment to change behaviors related to substance use and addiction. Therapist applied cognitive behavioral strategies to facilitate identification of maladaptive patterns of thinking and behavior that contribute to client's risk for continued substance use and relapse. Therapist employed group interaction activities  to build rapport among group members, promote sobriety, and emphasize relapse prevention. Therapist promoted safe nonjudgmental environment by providing group members with unconditional positive regard and encouraging group members to comply with group rules and guidelines. Therapist assisted group member with identifying and implementing healthier coping strategies.      Plan:  Continue [facility] IOP Phase I   Aftercare:  [facility] Phase II  Program Assignments:  Personal recovery plan, relapse prevention plan, attendance of recovery support group meetings, exploration of sponsorship, drug/alcohol screens.     Andre Zuñiga LCSW  5/10/2025      Part of this note may be an electronic transcription/translation of spoken language to printed text using the Dragon Dictation System.

## 2025-05-10 NOTE — PROGRESS NOTES
[facility] IOP GROUP     Date: 05/05/2025  Name: Ema Phan    Time In: 1800   Time Out: 2046     Number of participants: 7    IOP GROUP NOTE     Services Provided  Group Psychotherapy: 3 hour IOP group therapy session (Check-ins, Coping Skills, Relapse Prevention)  Education and Training:  Activity Therapy:  Individual Therapy:  Family Therapy:  MD Initial:  MD Follow-Up     Check Ins: Therapist continued facilitation of rapport building strategies between group members. Therapist asked that each patient check in with home life and recovery efforts and identify triggers, cravings, and high risk situations that arise between group sessions. Therapist provided empathy and support during group session.     Session Content/Coping Skills: Anjana (Behavioral Health Student/) attended for first part of session.  facilitated “self-care in colors” (retrieved from [email or web address]). Group discussion regarding self-care facilitated. Individual check ins completed. Clinician explored with group members the symptoms of stress and using self-care when feeling stressed. Cravings- Basic Principles and Coping with cravings- skills list psychoeducational material reviewed and discussed (retrieved from [email or web address]). Group members then participated in 2 truths and a lie icebreaker activity to build group cohesion and discussion.     Response: Patient attended class in person. Patient participated in completion of check in form. Patient on check in form answered no to question of “currently or since your last group meeting, have you had any suicidal thoughts or plan or intent to hurt yourself”, and patient also answered no on check in form to question of “currently or since your last group meeting, have you had any homicidal thoughts or plan or intent to hurt others”. Clinician discussed with patient scheduling with WARREN Giron, and patient was provided with number to  office.     Personal Assessment 0-10 Scale (0-none, 10-high)    Anxiety:  8   Depression:  4   Cravings: Patient did not record answer on check in sheet.      Assessment:     ..  Lab on 05/02/2025   Component Date Value Ref Range Status    Protime 05/02/2025 13.7  12.3 - 15.1 Seconds Final    INR 05/02/2025 0.98  0.90 - 1.10 Final    WBC 05/02/2025 6.83  3.40 - 10.80 10*3/mm3 Final    RBC 05/02/2025 4.31  3.77 - 5.28 10*6/mm3 Final    Hemoglobin 05/02/2025 12.8  12.0 - 15.9 g/dL Final    Hematocrit 05/02/2025 38.7  34.0 - 46.6 % Final    MCV 05/02/2025 89.8  79.0 - 97.0 fL Final    MCH 05/02/2025 29.7  26.6 - 33.0 pg Final    MCHC 05/02/2025 33.1  31.5 - 35.7 g/dL Final    RDW 05/02/2025 13.2  12.3 - 15.4 % Final    RDW-SD 05/02/2025 43.3  37.0 - 54.0 fl Final    MPV 05/02/2025 11.9  6.0 - 12.0 fL Final    Platelets 05/02/2025 116 (L)  140 - 450 10*3/mm3 Final    Glucose 05/02/2025 79  65 - 99 mg/dL Final    BUN 05/02/2025 6  6 - 20 mg/dL Final    Creatinine 05/02/2025 0.69  0.57 - 1.00 mg/dL Final    Sodium 05/02/2025 136  136 - 145 mmol/L Final    Potassium 05/02/2025 3.8  3.5 - 5.2 mmol/L Final    Chloride 05/02/2025 105  98 - 107 mmol/L Final    CO2 05/02/2025 20.9 (L)  22.0 - 29.0 mmol/L Final    Calcium 05/02/2025 9.0  8.6 - 10.5 mg/dL Final    Total Protein 05/02/2025 7.3  6.0 - 8.5 g/dL Final    Albumin 05/02/2025 3.9  3.5 - 5.2 g/dL Final    ALT (SGPT) 05/02/2025 65 (H)  1 - 33 U/L Final    AST (SGOT) 05/02/2025 85 (H)  1 - 32 U/L Final    Alkaline Phosphatase 05/02/2025 135 (H)  39 - 117 U/L Final    Total Bilirubin 05/02/2025 0.3  0.0 - 1.2 mg/dL Final    Globulin 05/02/2025 3.4  gm/dL Final    A/G Ratio 05/02/2025 1.1  g/dL Final    BUN/Creatinine Ratio 05/02/2025 8.7  7.0 - 25.0 Final    Anion Gap 05/02/2025 10.1  5.0 - 15.0 mmol/L Final    eGFR 05/02/2025 112.0  >60.0 mL/min/1.73 Final    Fibrosis Score 05/02/2025 0.52 (H)  0.00 - 0.21 Final    Fibrosis Stage 05/02/2025 Comment   Final                 F2 - Bridging fibrosis with few septa    Necroinflammat Activity Score 2025 0.54 (H)  0.00 - 0.17 Final    Necroinflammat Activity Grade 2025 A2-Moderate activity   Final    Methodology: 2025 Comment   Final    The analytes tested are performed by FibroSure-Specific methods.  Not intended for use with other diagnostic considerations.    Alpha 2-Macroglobulins, Qn 2025 376 (H)  110 - 276 mg/dL Final    Haptoglobin 2025 70  42 - 296 mg/dL Final    Apolipoprotein A-1 2025 124  116 - 209 mg/dL Final    Total Bilirubin 2025 0.2  0.0 - 1.2 mg/dL Final    GGT 2025 151 (H)  0 - 60 IU/L Final    ALT (SGPT) 2025 76 (H)  0 - 40 IU/L Final    HCV Qual Interp 2025 Comment   Final    Quantitative results of 6 biochemical tests are analyzed using  a computational algorithm to provide a quantitative surrogate  marker (0.0-1.0) for liver fibrosis (METAVIR F0-F4) and for  necroinflammatory activity (METAVIR A0-A3).    Fibrosis Scorin2025 Comment   Final         <=0.21 = Stage F0 - No fibrosis  0.21 - 0.27 = Stage F0 - F1  0.27 - 0.31 = Stage F1 - Portal fibrosis  0.31 - 0.48 = Stage F1 - F2  0.48 - 0.58 = Stage F2 - Bridging fibrosis with few septa  0.58 - 0.72 = Stage F3 - Bridging fibrosis with many septa  0.72 - 0.74 = Stage F3 - F4        >0.74 = Stage F4 - Cirrhosis    Necroinflamm Activity Scorin2025 Comment   Final          <0.17 = Grade A0 - No Activity  0.17 - 0.29 = Grade A0 - A1  0.29 - 0.36 = Grade A1 - Minimal activity  0.36 - 0.52 = Grade A1 - A2  0.52 - 0.60 = Grade A2 - Moderate activity  0.60 - 0.62 = Grade A2 - A3        >0.62 = Grade A3 - Severe activity    Limitations: 2025 Comment   Final    The negative predictive value of a Fibrotest score <0.31 (absence of  clinically significant fibrosis) was 85% when compared to liver biopsy  in 1,270 HCV infected patients with a 38% prevalence of significant  liver fibrosis (F2, 3 or 4). The  positive predictive value of a Fibro-  test score >0.48 (F2, 3, 4) was 61% in that same patient cohort. HCV  FibroSURE is not recommended in patients with Gilbert Disease, acute  hemolysis (e.g. HCV ribavirin therapy mediated hemolysis) acute hepa-  titis of the liver, extra-hepatic cholestasis, transplant patients,  and/or renal insufficiency patients.  Any of these clinical situations  may lead to inaccurate quantitative predictions of fibrosis and  necroinflammatory activity in the liver.    Comment 05/02/2025 Comment   Final    This test was developed and its performance characteristics determined  by [employer].  It has not been cleared or approved by the Food and Drug  Administration.  The FDA has determined that such clearance or  approval is not necessary.  For questions regarding this report please contact customer service  at [phone].    Hepatitis C Quantitation 05/02/2025 [id number]  IU/mL Final    HCV log10 05/02/2025 6.068  log10 IU/mL Final    HCV Test Information 05/02/2025 Comment   Final    The quantitative range of this assay is 15 IU/mL to 100 million IU/mL.    HCV Genotype 05/02/2025 Comment   Final    To be performed on this specimen.    Hep A Total Ab 05/02/2025 Positive (A)  Negative Final    Comment: The HAV total antibody assay detects both IgG and IgM  but does not differentiate between them. A negative result  suggests susceptibility to infection. A positive result could  be due to vaccination, previously resolved infection or active  infection. Testing for HAV IgM should be performed if active  HAV infection is suspected. [facility] offers profiles that will  automatically reflex positive HAV total antibody results to  IgM (e.g., panel #[id number] HAV Antibody w/ Rfx).    Hep B Core Total Ab 05/02/2025 Positive (A)  Negative Final    Hepatitis B Surface Ag 05/02/2025 Non-Reactive  Non-Reactive Final    Hep B S Ab 05/02/2025 Non-Reactive   Final    HIV-1/ HIV-2 Ab 05/02/2025 Non-Reactive   Non-Reactive Final    HIV-1 P24 Ag Screen 05/02/2025 Non-Reactive  Non-Reactive Final    Hepatitis C Genotype 05/02/2025 1a   Final   Lab on 04/28/2025   Component Date Value Ref Range Status    THC, Screen, Urine 04/28/2025 Negative  Negative Final    Phencyclidine (PCP), Urine 04/28/2025 Negative  Negative Final    Cocaine Screen, Urine 04/28/2025 Negative  Negative Final    Methamphetamine, Ur 04/28/2025 Negative  Negative Final    Opiate Screen 04/28/2025 Negative  Negative Final    Amphetamine Screen, Urine 04/28/2025 Negative  Negative Final    Benzodiazepine Screen, Urine 04/28/2025 Negative  Negative Final    Tricyclic Antidepressants Screen 04/28/2025 Negative  Negative Final    Methadone Screen, Urine 04/28/2025 Negative  Negative Final    Barbiturates Screen, Urine 04/28/2025 Negative  Negative Final    Oxycodone Screen, Urine 04/28/2025 Negative  Negative Final    Buprenorphine, Screen, Urine 04/28/2025 Negative  Negative Final    6-ACETYLMORPHINE 04/28/2025 Not Detected  10 ng/mL ng/mL Final    AMPHETAMINE 04/28/2025 Not Detected  500 ng/mL ng/mL Final    Barbiturates 04/28/2025 Not Detected  200 ng/mL ng/mL Final    Benzodiazepines 04/28/2025 Not Detected  200 ng/mL ng/mL Final    BUPRENORPHINE 04/28/2025 Not Detected  5 ng/mL ng/mL Final    Cocaine Metabolite 04/28/2025 Not Detected  150 ng/mL ng/mL Final    EDDP 04/28/2025 Not Detected  100 ng/mL ng/mL Final    ALCOHOL, ETHYL 04/28/2025 Not Detected  <100.0000 ng/mL Final    FENTANYL 04/28/2025 Not Detected  1 ng/mL ng/mL Final    METHAMPHETAMINE 04/28/2025 Not Detected  500 ng/mL ng/mL Final    Opiates 04/28/2025 Not Detected  300 ng/mL ng/mL Final    OXYCODONE 04/28/2025 Not Detected  100 ng/mL ng/mL Final    PCP 04/28/2025 Not Detected  25 ng/mL ng/mL Final    THC 04/28/2025 Not Detected  50 ng/mL ng/mL Final    TRICYCLIC ANTIDEPRESSANTS 04/28/2025 Not Detected  300 ng/mL ng/mL Final    Reference Lab Report 04/28/2025    Final    See Full Screen  for results.    METHYLPHENIDATE 04/28/2025 Not Detected  10 ng/mL ng/mL Final    BUPRENORPHINE 04/28/2025 Not Detected  10 ng/mL ng/mL Final    NORBUPRENORPHINE 04/28/2025 Not Detected  10 ng/mL ng/mL Final    PREGABALIN 04/28/2025 Not Detected  250 ng/mL ng/mL Final    9-DELTA-THC-COOH 04/28/2025 Not Detected  100 ng/mL ng/mL Final    ZOLPIDEM 04/28/2025 Not Detected  2 ng/mL ng/mL Final    O-Desmethyltramadol 04/28/2025 Not Detected  100 ng/mL ng/mL Final    Tramadol 04/28/2025 Not Detected  50 ng/mL ng/mL Final    GABAPENTIN 04/28/2025 Not Detected  500 ng/mL ng/mL Final   Lab on 04/23/2025   Component Date Value Ref Range Status    THC, Screen, Urine 04/23/2025 Positive (A)  Negative Final    Phencyclidine (PCP), Urine 04/23/2025 Negative  Negative Final    Cocaine Screen, Urine 04/23/2025 Negative  Negative Final    Methamphetamine, Ur 04/23/2025 Negative  Negative Final    Opiate Screen 04/23/2025 Negative  Negative Final    Amphetamine Screen, Urine 04/23/2025 Negative  Negative Final    Benzodiazepine Screen, Urine 04/23/2025 Positive (A)  Negative Final    Tricyclic Antidepressants Screen 04/23/2025 Negative  Negative Final    Methadone Screen, Urine 04/23/2025 Negative  Negative Final    Barbiturates Screen, Urine 04/23/2025 Negative  Negative Final    Oxycodone Screen, Urine 04/23/2025 Negative  Negative Final    Buprenorphine, Screen, Urine 04/23/2025 Positive (A)  Negative Final    Reference Lab Report 04/23/2025    Final    See Full Screen for results.    GABAPENTIN 04/23/2025 Not Detected  500 ng/mL ng/mL Final    O-Desmethyltramadol 04/23/2025 Not Detected  100 ng/mL ng/mL Final    Tramadol 04/23/2025 Not Detected  50 ng/mL ng/mL Final    ZOLPIDEM 04/23/2025 Not Detected  2 ng/mL ng/mL Final    9-DELTA-THC-COOH 04/23/2025 124^DETECTED  100 ng/mL ng/mL Final    PREGABALIN 04/23/2025 Not Detected  250 ng/mL ng/mL Final    BUPRENORPHINE 04/23/2025 356^DETECTED  10 ng/mL ng/mL Final    NORBUPRENORPHINE  04/23/2025 335^DETECTED  10 ng/mL ng/mL Final    METHYLPHENIDATE 04/23/2025 Not Detected  10 ng/mL ng/mL Final    6-ACETYLMORPHINE 04/23/2025 Not Detected  10 ng/mL ng/mL Final    AMPHETAMINE 04/23/2025 Not Detected  500 ng/mL ng/mL Final    Barbiturates 04/23/2025 Not Detected  200 ng/mL ng/mL Final    Benzodiazepines 04/23/2025 Detected  200 ng/mL ng/mL Final    BUPRENORPHINE 04/23/2025 Detected  5 ng/mL ng/mL Final    Cocaine Metabolite 04/23/2025 Not Detected  150 ng/mL ng/mL Final    EDDP 04/23/2025 Not Detected  100 ng/mL ng/mL Final    ALCOHOL, ETHYL 04/23/2025 Not Detected  <100.0000 ng/mL Final    FENTANYL 04/23/2025 Not Detected  1 ng/mL ng/mL Final    METHAMPHETAMINE 04/23/2025 Not Detected  500 ng/mL ng/mL Final    Opiates 04/23/2025 Not Detected  300 ng/mL ng/mL Final    OXYCODONE 04/23/2025 Not Detected  100 ng/mL ng/mL Final    PCP 04/23/2025 Not Detected  25 ng/mL ng/mL Final    THC 04/23/2025 Detected  50 ng/mL ng/mL Final    TRICYCLIC ANTIDEPRESSANTS 04/23/2025 Not Detected  300 ng/mL ng/mL Final    Reference Lab Report 04/23/2025    Final    See Full Screen for results.    7- AMINOCLONAZEPAM 04/23/2025 Not Detected  50 ng/mL ng/mL Final    ALPHA-HYDROXYALPRAZOLAM 04/23/2025 455^DETECTED  50 ng/mL ng/mL Final    Alprazolam 04/23/2025 175^DETECTED  50 ng/mL ng/mL Final    CLONAZEPAM 04/23/2025 Not Detected  50 ng/mL ng/mL Final    DIAZEPAM 04/23/2025 Not Detected  50 ng/mL ng/mL Final    Flunitrazepam 04/23/2025 Not Detected  50 ng/mL ng/mL Final    FLURAZEPAM 04/23/2025 Not Detected  50 ng/mL ng/mL Final    LORAZEPAM 04/23/2025 Not Detected  100 ng/mL ng/mL Final    Midazolam, Urine 04/23/2025 Not Detected  50 ng/mL ng/mL Final    NORDIAZEPAM 04/23/2025 Not Detected  50 ng/mL ng/mL Final    OXAZEPAM 04/23/2025 Not Detected  50 ng/mL ng/mL Final    TEMAZEPAM 04/23/2025 Not Detected  50 ng/mL ng/mL Final       Mental Status Exam  Hygiene:  good  Dress: casual  Attitude: cooperative and agreeable    Motor Activity: appropriate  Eye Contact:  good  Speech: regular rate and rhythm   Mood:  calm and cooperative  Affect:  Appropriate  Thought Processes:  Linear  Thought Content:  Normal  Suicidal Thoughts:  denies  Homicidal Thoughts:  denies  Crisis Safety Plan: Safety plan has been discussed.   Hallucinations:  Unknown to clinician.   Reliability: fair  Insight: fair  Judgement: fair  Impulse Control: fair    Recovery/spiritual support group attendance: No.      Progress toward goal: Not at goal    Prognosis: Fair with Ongoing Treatment     Self-reported number of days sober: Patient did not record answer on check in form.     Patient will contact this office, call 911 or present to the nearest emergency room should suicidal or homicidal ideations occur.    Impression/Formulation:    ICD-10-CM ICD-9-CM   1. Opioid use disorder, severe, on maintenance therapy  F11.20 304.00   2. Methamphetamine use disorder, severe, in early remission  F15.21 304.43   3. Sedative, hypnotic or anxiolytic use disorder, severe, dependence  F13.20 304.10   4. Moderate cannabis use disorder  F12.20 304.30       Clinical Maneuvering/Interventions: Therapist utilized a person-centered approach to build rapport with group member. Therapist implemented motivational interviewing techniques to assist client with exploring and resolving ambivalence associated with commitment to change behaviors related to substance use and addiction. Therapist applied cognitive behavioral strategies to facilitate identification of maladaptive patterns of thinking and behavior that contribute to client's risk for continued substance use and relapse. Therapist employed group interaction activities to build rapport among group members, promote sobriety, and emphasize relapse prevention. Therapist promoted safe nonjudgmental environment by providing group members with unconditional positive regard and encouraging group members to comply with group rules and  guidelines. Therapist assisted group member with identifying and implementing healthier coping strategies.      Plan:  Continue [facility] IOP Phase I   Aftercare:  [facility] Phase II  Program Assignments:  Personal recovery plan, relapse prevention plan, attendance of recovery support group meetings, exploration of sponsorship, drug/alcohol screens.     Andre Zuñiga LCSW  5/10/2025      Part of this note may be an electronic transcription/translation of spoken language to printed text using the Dragon Dictation System.

## 2025-05-13 ENCOUNTER — LAB (OUTPATIENT)
Dept: LAB | Facility: HOSPITAL | Age: 40
End: 2025-05-13
Payer: MEDICAID

## 2025-05-13 DIAGNOSIS — F12.20 CANNABIS USE DISORDER, MODERATE, DEPENDENCE: ICD-10-CM

## 2025-05-13 DIAGNOSIS — F13.20 SEDATIVE, HYPNOTIC OR ANXIOLYTIC USE DISORDER, SEVERE, DEPENDENCE: ICD-10-CM

## 2025-05-13 DIAGNOSIS — F15.21 METHAMPHETAMINE USE DISORDER, SEVERE, IN EARLY REMISSION: ICD-10-CM

## 2025-05-13 DIAGNOSIS — F11.20 OPIOID USE DISORDER, SEVERE, ON MAINTENANCE THERAPY: ICD-10-CM

## 2025-05-13 LAB
6-ACETYLMORPHINE: NOT DETECTED NG/ML
9-DELTA-THC-COOH: NOT DETECTED NG/ML
ALCOHOL, ETHYL: NOT DETECTED NG/ML
AMPHET+METHAMPHET UR QL: NEGATIVE
AMPHETAMINE: NOT DETECTED NG/ML
AMPHETAMINES UR QL: NEGATIVE
BARBITURATES UR QL SCN: NEGATIVE
BENZODIAZ BLD QL: NOT DETECTED NG/ML
BENZODIAZ UR QL SCN: POSITIVE
BUPRENORPHINE SAL CFM-MCNC: DETECTED NG/ML
BUPRENORPHINE SERPL-MCNC: POSITIVE NG/ML
BUPRENORPHINE: NORMAL NG/ML
CANNABINOIDS SERPL QL: NEGATIVE
COCAINE METABOLITE: NOT DETECTED NG/ML
COCAINE UR QL: NEGATIVE
EDDP SERPL QL: NOT DETECTED NG/ML
FENTANYL-EIA: NOT DETECTED NG/ML
GABAPENTIN: NOT DETECTED NG/ML
METHADONE UR QL SCN: NEGATIVE
METHAMPHETAMINE: NOT DETECTED NG/ML
METHYLPHENIDATE: NOT DETECTED NG/ML
NORBUPRENORPHINE: NORMAL NG/ML
O-DESMETHYLTRAMADOL: NOT DETECTED NG/ML
OPIATES UR QL: NEGATIVE
OPIATES: NOT DETECTED NG/ML
OXYCODONE UR QL SCN: NEGATIVE
OXYCODONE: NOT DETECTED NG/ML
PCP UR QL SCN: NEGATIVE
PCP: NOT DETECTED NG/ML
PREGABALIN: NOT DETECTED NG/ML
THC: NOT DETECTED NG/ML
TRAMADOL UR CFM-MCNC: NOT DETECTED NG/ML
TRICYCLIC ANTIDEPRESSANTS: NOT DETECTED NG/ML
TRICYCLICS UR QL SCN: NEGATIVE
ZOLPIDEM: NOT DETECTED NG/ML

## 2025-05-13 PROCEDURE — 80306 DRUG TEST PRSMV INSTRMNT: CPT

## 2025-05-15 ENCOUNTER — APPOINTMENT (OUTPATIENT)
Dept: PSYCHIATRY | Facility: HOSPITAL | Age: 40
End: 2025-05-15
Payer: MEDICAID

## 2025-05-15 ENCOUNTER — DOCUMENTATION (OUTPATIENT)
Dept: PSYCHIATRY | Facility: HOSPITAL | Age: 40
End: 2025-05-15
Payer: MEDICAID

## 2025-05-15 LAB — REF LAB TEST METHOD: NORMAL

## 2025-05-15 NOTE — PROGRESS NOTES
CD-IOP 5/14/2025 Discharge:  Clinician spoke with patient before CD-IOP group on 5/14/2025. Patient's preliminary UDS on 5/13/2025 showed positive for benzodiazepine. Patient did report taking 1 Xanax the day prior to this session. Patient was previously told that she would need to come off of this medication per APRN (see previous notes in chart). Due to this recurrence of Xanax use, along with the recent legal situation (documented in previous notes in chart), both of which could be considered risk factors for continued use, clinician at this time is recommending a higher level of care. Clinician did explain to patient that she would be discharged and a higher level of care is being recommended.     Clinician on 5/15/2025 at approximately 02:47 PM, clinician called and spoke with patient ([phone]). Patient stated that she would consider going to rehab but wanted to wait until Monday.     [facility]  updated this date.     -Andre Zuñiga LCSW  5/15/2025.

## 2025-05-16 LAB
6-ACETYLMORPHINE: NOT DETECTED NG/ML
9-DELTA-THC-COOH: NOT DETECTED NG/ML
ALCOHOL, ETHYL: NOT DETECTED NG/ML
AMPHETAMINE: NOT DETECTED NG/ML
BENZODIAZ BLD QL: NOT DETECTED NG/ML
BUPRENORPHINE SAL CFM-MCNC: DETECTED NG/ML
BUPRENORPHINE: NORMAL NG/ML
COCAINE METABOLITE: NOT DETECTED NG/ML
EDDP SERPL QL: NOT DETECTED NG/ML
FENTANYL-EIA: NOT DETECTED NG/ML
GABAPENTIN: NOT DETECTED NG/ML
METHAMPHETAMINE: NOT DETECTED NG/ML
METHYLPHENIDATE: NOT DETECTED NG/ML
NORBUPRENORPHINE: NORMAL NG/ML
O-DESMETHYLTRAMADOL: NOT DETECTED NG/ML
OPIATES: NOT DETECTED NG/ML
OXYCODONE: NOT DETECTED NG/ML
PCP: NOT DETECTED NG/ML
PREGABALIN: NOT DETECTED NG/ML
THC: NOT DETECTED NG/ML
TRAMADOL UR CFM-MCNC: NOT DETECTED NG/ML
TRICYCLIC ANTIDEPRESSANTS: NOT DETECTED NG/ML
ZOLPIDEM PHENYL-4-CARBOXYLIC ACID: NOT DETECTED NG/ML

## 2025-05-19 ENCOUNTER — APPOINTMENT (OUTPATIENT)
Dept: PSYCHIATRY | Facility: HOSPITAL | Age: 40
End: 2025-05-19
Payer: MEDICAID

## 2025-05-21 ENCOUNTER — SPECIALTY PHARMACY (OUTPATIENT)
Dept: PHARMACY | Facility: HOSPITAL | Age: 40
End: 2025-05-21
Payer: MEDICAID

## 2025-05-21 ENCOUNTER — APPOINTMENT (OUTPATIENT)
Dept: PSYCHIATRY | Facility: HOSPITAL | Age: 40
End: 2025-05-21
Payer: MEDICAID

## 2025-05-21 DIAGNOSIS — B18.2 CHRONIC HEPATITIS C WITHOUT HEPATIC COMA: Primary | ICD-10-CM

## 2025-05-22 ENCOUNTER — APPOINTMENT (OUTPATIENT)
Dept: PSYCHIATRY | Facility: HOSPITAL | Age: 40
End: 2025-05-22
Payer: MEDICAID

## 2025-05-27 ENCOUNTER — TELEPHONE (OUTPATIENT)
Dept: PSYCHIATRY | Facility: CLINIC | Age: 40
End: 2025-05-27
Payer: MEDICAID

## 2025-05-27 NOTE — TELEPHONE ENCOUNTER
Patient called and left a voicemail requesting lab results.  Patient states that another doctor told her she has cirrhosis of the liver and patient woul dlike to start dialysis.  Called to discuss with patient to have her call the provider that ordered her labs to give the results.  No answer, left voicemail for patient to call the office.

## 2025-05-28 ENCOUNTER — APPOINTMENT (OUTPATIENT)
Dept: PSYCHIATRY | Facility: HOSPITAL | Age: 40
End: 2025-05-28
Payer: MEDICAID

## 2025-05-29 ENCOUNTER — APPOINTMENT (OUTPATIENT)
Dept: PSYCHIATRY | Facility: HOSPITAL | Age: 40
End: 2025-05-29
Payer: MEDICAID

## 2025-06-02 ENCOUNTER — APPOINTMENT (OUTPATIENT)
Dept: PSYCHIATRY | Facility: HOSPITAL | Age: 40
End: 2025-06-02
Payer: MEDICAID

## 2025-06-04 ENCOUNTER — APPOINTMENT (OUTPATIENT)
Dept: PSYCHIATRY | Facility: HOSPITAL | Age: 40
End: 2025-06-04
Payer: MEDICAID

## 2025-06-05 ENCOUNTER — APPOINTMENT (OUTPATIENT)
Dept: PSYCHIATRY | Facility: HOSPITAL | Age: 40
End: 2025-06-05
Payer: MEDICAID

## 2025-06-06 ENCOUNTER — SPECIALTY PHARMACY (OUTPATIENT)
Dept: PHARMACY | Facility: HOSPITAL | Age: 40
End: 2025-06-06
Payer: MEDICAID

## 2025-06-06 DIAGNOSIS — B18.2 CHRONIC HEPATITIS C WITHOUT HEPATIC COMA: Primary | ICD-10-CM

## 2025-06-06 RX ORDER — BENZTROPINE MESYLATE 2 MG/1
2 TABLET ORAL
COMMUNITY
Start: 2025-05-15 | End: 2025-06-06

## 2025-06-06 RX ORDER — BUPRENORPHINE AND NALOXONE 8; 2 MG/1; MG/1
2 FILM, SOLUBLE BUCCAL; SUBLINGUAL DAILY
COMMUNITY
Start: 2025-05-22

## 2025-06-06 RX ORDER — QUETIAPINE FUMARATE 50 MG/1
50 TABLET, FILM COATED ORAL
COMMUNITY
Start: 2025-05-07 | End: 2025-06-06

## 2025-06-06 RX ORDER — ALPRAZOLAM 0.5 MG
0.5 TABLET ORAL 3 TIMES DAILY PRN
COMMUNITY
Start: 2025-05-20

## 2025-06-06 RX ORDER — GLECAPREVIR AND PIBRENTASVIR 40; 100 MG/1; MG/1
3 TABLET, FILM COATED ORAL DAILY
Qty: 84 TABLET | Refills: 1 | Status: SHIPPED | OUTPATIENT
Start: 2025-06-06

## 2025-06-06 RX ORDER — BENZTROPINE MESYLATE 1 MG/1
1 TABLET ORAL
COMMUNITY
Start: 2025-05-19 | End: 2025-06-06

## 2025-06-06 NOTE — PROGRESS NOTES
[facility]  Hepatitis C Initial Assessment     Ema Phan is a 39 y.o. female diagnosed with Hepatitis C and enrolled in the [facility] for treatment. An initial outreach was conducted, including assessment of therapy appropriateness and specialty medication education for Mavyret.    Previous Hep C Treatment: Patient is treatment naïve    Relevant Past Medical History and Comorbidities  Past Medical History:   Diagnosis Date    Addiction     Anxiety     Depression     Hepatitis C     Migraine     Panic attacks      Social History     Socioeconomic History    Marital status:    Tobacco Use    Smoking status: Former     Current packs/day: 0.00     Types: Cigarettes     Quit date: 9/1/2021     Years since quitting: 3.7     Passive exposure: Past    Smokeless tobacco: Never   Vaping Use    Vaping status: Every Day    Substances: Nicotine, Flavoring    Devices: Disposable    Passive vaping exposure: Yes   Substance and Sexual Activity    Alcohol use: No    Drug use: Not Currently     Types: Methamphetamines, Heroin, Marijuana     Comment: sober for two years    Sexual activity: Defer       Problem list reviewed by Gia Johns RPH on 6/6/2025 at  3:08 PM    Allergies  Patient has no known allergies.    Allergies reviewed by Gia Johns RPH on 6/6/2025 at  3:08 PM    Insurance Coverage & Financial Support: [employer]     Current Medication List:    Current Outpatient Medications:     Glecaprevir-Pibrentasvir (Mavyret) 100-40 MG tablet, Take 3 tablets by mouth Daily., Disp: 84 tablet, Rfl: 1    ALPRAZolam (XANAX) 0.5 MG tablet, Take 1 tablet by mouth 3 (Three) Times a Day As Needed for Anxiety., Disp: , Rfl:     bisacodyl (Dulcolax) 5 MG EC tablet, Follow instructions given at office, Disp: 4 tablet, Rfl: 0    buprenorphine-naloxone (SUBOXONE) 8-2 MG film film, Place 2 films under the tongue Daily., Disp: , Rfl:     busPIRone (BUSPAR) 15 MG tablet, Take 1  tablet by mouth 3 (Three) Times a Day., Disp: , Rfl:     FLUoxetine (PROzac) 20 MG capsule, Take 1 capsule by mouth Daily., Disp: , Rfl:     Hydrocortisone, Perianal, (Anusol-HC) 2.5 % rectal cream, Insert  into the rectum 2 (Two) Times a Day. (Patient taking differently: Insert  into the rectum 2 (Two) Times a Day As Needed.), Disp: 28 g, Rfl: 1    hydrOXYzine pamoate (VISTARIL) 25 MG capsule, Take 1 capsule by mouth 3 (Three) Times a Day As Needed for Anxiety., Disp: , Rfl:     polyethylene glycol (MIRALAX) 17 GM/SCOOP powder, Take 17 g by mouth Daily. Mix with 8 oz liquid, Disp: 510 g, Rfl: 5    promethazine (PHENERGAN) 25 MG tablet, Take 1 tablet by mouth Every 8 (Eight) Hours As Needed for Vomiting or Nausea., Disp: , Rfl:     tiZANidine (ZANAFLEX) 4 MG tablet, Take 1 tablet by mouth Every Night., Disp: , Rfl:     Medicines reviewed by Gia Johns [facility] on 6/6/2025 at  3:08 PM    Drug Interactions:  Medication list was reviewed for drug-drug interactions with Hepatitis C treatment. Tizanidine drug interaction noted. Patient will hold medication while on Mavyret.     Relevant Laboratory Values:  Lab Results   Component Value Date    GLUCOSE 79 05/02/2025    CALCIUM 9.0 05/02/2025     05/02/2025    K 3.8 05/02/2025    CO2 20.9 (L) 05/02/2025     05/02/2025    BUN 6 05/02/2025    CREATININE 0.69 05/02/2025    EGFRIFNONA 84 03/04/2020    BCR 8.7 05/02/2025    ANIONGAP 10.1 05/02/2025    AST 85 (H) 05/02/2025    ALT 65 (H) 05/02/2025     Lab Results   Component Value Date    WBC 6.83 05/02/2025    HGB 12.8 05/02/2025    HCT 38.7 05/02/2025    MCV 89.8 05/02/2025     (L) 05/02/2025       HCV RNA quant: 1,170,000  Hepatitis C Genotype   Date Value Ref Range Status   05/02/2025 1a  Final   09/03/2024 Detected (A) Not Detected. Final     HCV Genotype   Date Value Ref Range Status   05/02/2025 Comment  Final     Comment:     To be performed on this specimen.       Fibrosis: F2    FIB-4:  3.73    Immunizations:  Hepatitis A: immune  Hepatitis B: needs vaccine  Lab Results   Component Value Date    HAV Positive (A) 05/02/2025    HEPAIGM Non-Reactive 03/04/2020    HEPBCAB Positive (A) 05/02/2025         Co-infection Evaluation:  HIV -- Negative  Hepatitis B -- negative      Initial Education Provided for Mavyret  The patient has been provided with the following education for MAVYRET (glecaprevir and pibrentasvir). All questions and concerns have been addressed prior to the patient receiving the medication, and the patient has verbalized understanding of the education and any materials provided. Additional patient education shall be provided and documented upon request by the patient, provider or payer.      The following counseling points for Mavyret (glecaprevir and pibrentasvir) were addressed:  Goal of treatment:  This medication is used to treat hepatitis C infection with the goal of curing infection.  Directions for use:  Take 3 tablets by mouth once daily for a total of 8 weeks. Take tablets at the same time each day, with food.  Missed doses:  It is very important that you do not miss a dose of this medication. Use a pill planner, medication adherence geeta or other tool to help you remember to take your medication.  If you do miss a dose and it is within 18 hours from the usual dosage time, administer dose as soon as possible, then administer next dose at the usual dosage time. If more than 18 hours from the usual dosage time has passed, skip the missed dose and administer the next dose at the usual time.  Do not stop taking this medication without talking to your provider.  Adverse effects:  Frequently reported side effects of this medication include: headache, loss of energy, nausea and diarrhea.   Go to the ED or call 911 with signs of a significant reaction including wheezing; chest tightness; fever; itching; bad cough; blue skin color; seizures; or swelling of face, lips, tongue or throat.    Hepatic decompensation and hepatic failure have been reported. This typically occurs within the first 4 weeks of treatment initiation. Talk to your doctor right away if you notice dark urine, fatigue, lack of appetite, nausea, abdominal pain, light-colored stools, vomiting or yellow skin.  Follow-Up:  Be sure to keep your follow up appointment with our clinic 4 weeks after starting this medication to ensure you are tolerating it well and that you are responding appropriately to therapy.   Make sure to tell your doctor and pharmacist about all medications you are taking, including herbal supplements and OTC products at each visit. Contact clinic if any new medications are started during treatment.  Storage:  Store this medication at room temperature, away from any extreme temperatures or moisture exposure.    Patient Specific Counseling Points:   If childbearing age: Use of contraception during treatment in females of childbearing potential is recommended. Consider postponing pregnancy until therapy is complete to reduce the risk of HCV transmission. This medication should not be used in pregnant females and it is not known if the medication is present in breast milk.         Adherence and Self-Administration  Barriers to Patient Adherence and/or Self-Administration: None  Methods for Supporting Patient Adherence and/or Self-Administration: None Required     Associated Vaccinations   Your chronic liver disease puts you at risk for serious complications if you get infected with hepatitis A virus. If you've never been vaccinated against hepatitis A, you need 2 doses of this vaccine, usually spaced 6-19 months apart.     If you already have chronic hepatitis B infection, you won't need a hepatitis B vaccine.  However, if you do not have sufficient Hepatitis B antibodies (either not vaccinated or insufficient response to vaccination), you should get the Hepatitis B vaccination series.  The vaccine is given in 2 or 3  doses, depending on the brand.     A combination A & B vaccination is also available if both are needed.     Contraindications: Severe allergic reaction (eg, anaphylaxis) after a previous dose of any hepatitis A-containing or hepatitis B-containing vaccine or any component of the formulation, including yeast and neomycin.   Precautions: Consider deferring administration in patients with moderate or severe acute illness. Use with caution in patients with bleeding disorders or severely immunocompromised patients    Ema Cohn Emilie was counseled that the following immunizations are recommended: Hepatitis B    Goals of Therapy:  Patient Goals of Therapy: Adherence  Clinical Goals or Therapeutic Targets, If Applicable: SVR 12 weeks    Attestation:  I attest that the initiated specialty medication is appropriate for the patient based on my assessment.  If the prescribed therapy is at any point deemed not appropriate based on the current or future assessments, a consultation will be initiated with the patient's specialty care provider to determine the best course of action. The revised plan of therapy will be documented along with any additional patient education provided.     Assessment & Plan:  Patient has Hepatitis C, genotype 1a (F2)(calculated FIB-4 = 3.73) and is treatment naïve. Patient has been prescribed Mavyret 3 tablets daily with food x 8 weeks. Medication education and counseling provided as above.  Patient will obtain medication from [facility] retail pharmacy.  The following immunizations are needed: Hepatitis B.   Patient will repeat HCV labs 4-weeks after starting medication and  follow up in clinic in 8-weeks (EOT) to assess virologic response and medication tolerability.     Gia Johns RPH  6/6/2025  15:14 EDT

## 2025-06-10 ENCOUNTER — TELEPHONE (OUTPATIENT)
Dept: GASTROENTEROLOGY | Facility: CLINIC | Age: 40
End: 2025-06-10
Payer: MEDICAID

## 2025-06-11 ENCOUNTER — TELEPHONE (OUTPATIENT)
Dept: GASTROENTEROLOGY | Facility: CLINIC | Age: 40
End: 2025-06-11
Payer: MEDICAID

## 2025-06-11 NOTE — TELEPHONE ENCOUNTER
Noted, she should procedure as recommended. Can go to ER with any severe symptoms.    ----- Message from Emmett HULL sent at 6/10/2025  3:25 PM EDT -----  Rescheduled pt for 6/30, soonest available she could do. She stated that she is still experiencing roughly the same level of abdominal pain and bright red blood in stool intermittently.    ----- Message -----  From: Didi Monsivais RegSched Rep  Sent: 6/10/2025   2:45 PM EDT  To: Grecia Moore MA    The patient did not show for her colonoscopy 6/10/25.

## 2025-06-23 ENCOUNTER — DOCUMENTATION (OUTPATIENT)
Dept: PSYCHIATRY | Facility: HOSPITAL | Age: 40
End: 2025-06-23
Payer: MEDICAID

## 2025-06-23 NOTE — PROGRESS NOTES
[facility] [address], [address]  [phone]    CHEMICAL DEPENDENCY   INTENSIVE OUTPATIENT PROGRAM    PHASE I  DISCHARGE SUMMARY        ATTENDING: WARREN Alvares  THERAPIST: Andre Zuñiga LCSW    DATE OF ADMISSION: 4/7/2025 (Date of first CD-IOP class completed).   DATE OF DISCHARGE: 5/14/2025      REASON FOR ADMISSION: Ema Phan was referred by Court and admitted to IOP Phase I for Opioid use disorder, severe on maintenance therapy; Methamphetamine use disorder, severe, in early remission; Sedative, hypnotic, or anxiolytic use disorder, severe, dependence; and Moderate cannabis use disorder.     SUMMARY OF CARE, TREATMENT, and SERVICES PROVIDED: Ema Phan was assessed and determined to meet IOP level of care on 4/2/2025. Pt began IOP on 4/7/2025 and completed 11 group therapy sessions. Pt had 4 unexcused and 1 excused absences.      CD-IOP Phase I UDS Results:  4/9/2025: Positive for Benzodiazepines, Buprenorphine, Norbuprenorphine, THC.   4/14/2025: Positive for Benzodiazepines, Buprenorphine, Norbuprenorphine, THC.   4/23/2025: Positive for Benzodiazepines, Buprenorphine, Norbuprenorphine, THC.   4/28/2025: Negative results.   5/8/2025: Positive for Buprenorphine and Norbuprenorphine.   5/13/2025: Positive for Buprenorphine and Norbuprenorphine.     Patient self reported taking a Xanax the day prior to CD-IOP session on 5/14/2025 (see previous note in chart).    AFTERCARE PLAN: Ema Phan did not complete CD-IOP and was discharged on 5/14/2025 with a higher level of care recommended.       Current Outpatient Medications:     ALPRAZolam (XANAX) 0.5 MG tablet, Take 1 tablet by mouth 3 (Three) Times a Day As Needed for Anxiety., Disp: , Rfl:     bisacodyl (Dulcolax) 5 MG EC tablet, Follow instructions given at office, Disp: 4 tablet, Rfl: 0    buprenorphine-naloxone (SUBOXONE) 8-2 MG film film, Place 2 films under the tongue Daily., Disp: , Rfl:      busPIRone (BUSPAR) 15 MG tablet, Take 1 tablet by mouth 3 (Three) Times a Day., Disp: , Rfl:     FLUoxetine (PROzac) 20 MG capsule, Take 1 capsule by mouth Daily., Disp: , Rfl:     Glecaprevir-Pibrentasvir (Mavyret) 100-40 MG tablet, Take 3 tablets by mouth Daily., Disp: 84 tablet, Rfl: 1    Hydrocortisone, Perianal, (Anusol-HC) 2.5 % rectal cream, Insert  into the rectum 2 (Two) Times a Day. (Patient taking differently: Insert  into the rectum 2 (Two) Times a Day As Needed.), Disp: 28 g, Rfl: 1    hydrOXYzine pamoate (VISTARIL) 25 MG capsule, Take 1 capsule by mouth 3 (Three) Times a Day As Needed for Anxiety., Disp: , Rfl:     polyethylene glycol (MIRALAX) 17 GM/SCOOP powder, Take 17 g by mouth Daily. Mix with 8 oz liquid, Disp: 510 g, Rfl: 5    promethazine (PHENERGAN) 25 MG tablet, Take 1 tablet by mouth Every 8 (Eight) Hours As Needed for Vomiting or Nausea., Disp: , Rfl:     tiZANidine (ZANAFLEX) 4 MG tablet, Take 1 tablet by mouth Every Night., Disp: , Rfl:      PROGNOSIS: Poor without continued care (a higher level of care was recommended to patient).     -Andre Zuñiga LCSW.   6/23/2025.

## 2025-06-26 ENCOUNTER — LAB (OUTPATIENT)
Dept: LAB | Facility: HOSPITAL | Age: 40
End: 2025-06-26
Payer: MEDICAID

## 2025-06-26 ENCOUNTER — HOSPITAL ENCOUNTER (EMERGENCY)
Facility: HOSPITAL | Age: 40
Discharge: HOME | End: 2025-06-26
Attending: STUDENT IN AN ORGANIZED HEALTH CARE EDUCATION/TRAINING PROGRAM
Payer: MEDICAID

## 2025-06-26 ENCOUNTER — APPOINTMENT (OUTPATIENT)
Dept: GENERAL RADIOLOGY | Facility: HOSPITAL | Age: 40
End: 2025-06-26
Payer: MEDICAID

## 2025-06-26 VITALS
HEART RATE: 65 BPM | SYSTOLIC BLOOD PRESSURE: 106 MMHG | OXYGEN SATURATION: 96 % | WEIGHT: 140 LBS | DIASTOLIC BLOOD PRESSURE: 62 MMHG | HEIGHT: 61 IN | TEMPERATURE: 98 F | BODY MASS INDEX: 26.43 KG/M2 | RESPIRATION RATE: 16 BRPM

## 2025-06-26 DIAGNOSIS — H66.90 ACUTE OTITIS MEDIA, UNSPECIFIED OTITIS MEDIA TYPE: ICD-10-CM

## 2025-06-26 DIAGNOSIS — B34.9 VIRAL ILLNESS: Primary | ICD-10-CM

## 2025-06-26 DIAGNOSIS — F12.20 CANNABIS USE DISORDER, MODERATE, DEPENDENCE: ICD-10-CM

## 2025-06-26 DIAGNOSIS — F11.20 OPIOID USE DISORDER, SEVERE, ON MAINTENANCE THERAPY: ICD-10-CM

## 2025-06-26 DIAGNOSIS — F13.20 SEDATIVE, HYPNOTIC OR ANXIOLYTIC USE DISORDER, SEVERE, DEPENDENCE: ICD-10-CM

## 2025-06-26 DIAGNOSIS — F15.21 METHAMPHETAMINE USE DISORDER, SEVERE, IN EARLY REMISSION: ICD-10-CM

## 2025-06-26 LAB
AMPHET+METHAMPHET UR QL: NEGATIVE
AMPHETAMINES UR QL: NEGATIVE
BARBITURATES UR QL SCN: NEGATIVE
BENZODIAZ UR QL SCN: POSITIVE
BUPRENORPHINE SERPL-MCNC: POSITIVE NG/ML
CANNABINOIDS SERPL QL: POSITIVE
COCAINE UR QL: NEGATIVE
FLUAV RNA RESP QL NAA+PROBE: NOT DETECTED
FLUBV RNA RESP QL NAA+PROBE: NOT DETECTED
METHADONE UR QL SCN: NEGATIVE
OPIATES UR QL: NEGATIVE
OXYCODONE UR QL SCN: NEGATIVE
PCP UR QL SCN: NEGATIVE
RSV RNA RESP QL NAA+PROBE: NOT DETECTED
S PYO AG THROAT QL: NEGATIVE
SARS-COV-2 RNA RESP QL NAA+PROBE: NOT DETECTED
TRICYCLICS UR QL SCN: NEGATIVE

## 2025-06-26 PROCEDURE — 80306 DRUG TEST PRSMV INSTRMNT: CPT

## 2025-06-26 PROCEDURE — 87637 SARSCOV2&INF A&B&RSV AMP PRB: CPT | Performed by: STUDENT IN AN ORGANIZED HEALTH CARE EDUCATION/TRAINING PROGRAM

## 2025-06-26 PROCEDURE — 87081 CULTURE SCREEN ONLY: CPT | Performed by: STUDENT IN AN ORGANIZED HEALTH CARE EDUCATION/TRAINING PROGRAM

## 2025-06-26 PROCEDURE — 99283 EMERGENCY DEPT VISIT LOW MDM: CPT | Performed by: STUDENT IN AN ORGANIZED HEALTH CARE EDUCATION/TRAINING PROGRAM

## 2025-06-26 PROCEDURE — 87880 STREP A ASSAY W/OPTIC: CPT | Performed by: STUDENT IN AN ORGANIZED HEALTH CARE EDUCATION/TRAINING PROGRAM

## 2025-06-26 PROCEDURE — 71045 X-RAY EXAM CHEST 1 VIEW: CPT

## 2025-06-26 RX ORDER — ACETAMINOPHEN 500 MG
1000 TABLET ORAL ONCE
Status: COMPLETED | OUTPATIENT
Start: 2025-06-26 | End: 2025-06-26

## 2025-06-26 RX ORDER — IBUPROFEN 600 MG/1
600 TABLET, FILM COATED ORAL ONCE
Status: COMPLETED | OUTPATIENT
Start: 2025-06-26 | End: 2025-06-26

## 2025-06-26 RX ORDER — FLUTICASONE PROPIONATE 50 MCG
2 SPRAY, SUSPENSION (ML) NASAL DAILY
Qty: 15.8 G | Refills: 0 | Status: SHIPPED | OUTPATIENT
Start: 2025-06-26

## 2025-06-26 RX ORDER — GUAIFENESIN/DEXTROMETHORPHAN 100-10MG/5
5 SYRUP ORAL ONCE
Status: COMPLETED | OUTPATIENT
Start: 2025-06-26 | End: 2025-06-26

## 2025-06-26 RX ADMIN — IBUPROFEN 600 MG: 600 TABLET, FILM COATED ORAL at 16:15

## 2025-06-26 RX ADMIN — GUAIFENESIN AND DEXTROMETHORPHAN 5 ML: 100; 10 SYRUP ORAL at 16:16

## 2025-06-26 RX ADMIN — ACETAMINOPHEN 1000 MG: 500 TABLET, FILM COATED ORAL at 16:15

## 2025-06-26 NOTE — DISCHARGE INSTRUCTIONS
Take as prescribed.  Drink plenty of fluids.  Rest.  Follow-up with your primary care provider for further outpatient evaluation if symptoms persist.  Take Tylenol and Motrin as needed per directions on the package.  Use Flonase as prescribed.  Return to the ER for new or worsening symptoms or acute concerns.

## 2025-06-26 NOTE — ED PROVIDER NOTES
Subjective:  Chief Complaint:  Flu-like symptoms    History of Present Illness:  Patient is a 39-year-old female with history of substance abuse, hepatitis C, migraines, among others presenting to the ER with complaints of flulike symptoms that have been going on for about a week.  Patient states she was recently released from incarceration.  Patient states she has been coughing up green sputum.  She states she has had body aches, generally feels unwell.  Complains of sore throat as well.  Denies any chance of pregnancy.  Has had a tubal ligation.      Nurses Notes reviewed and agree, including vitals, allergies, social history and prior medical history.     REVIEW OF SYSTEMS: All systems reviewed and not pertinent unless noted.  Review of Systems   Constitutional:  Positive for fatigue.   HENT:  Positive for congestion.    Respiratory:  Positive for cough.    Musculoskeletal:  Positive for myalgias.   All other systems reviewed and are negative.      Past Medical History:   Diagnosis Date    Addiction     Anxiety     Depression     Hepatitis C     Migraine     Panic attacks        Allergies:    Patient has no known allergies.      Past Surgical History:   Procedure Laterality Date     SECTION      x 3    FOREARM FRACTURE SURGERY Right     TUBAL ABDOMINAL LIGATION           Social History     Socioeconomic History    Marital status:    Tobacco Use    Smoking status: Former     Current packs/day: 0.00     Types: Cigarettes     Quit date: 2021     Years since quitting: 3.8     Passive exposure: Past    Smokeless tobacco: Never   Vaping Use    Vaping status: Every Day    Substances: Nicotine, Flavoring    Devices: Disposable    Passive vaping exposure: Yes   Substance and Sexual Activity    Alcohol use: No    Drug use: Not Currently     Types: Methamphetamines, Heroin, Marijuana     Comment: sober for two years    Sexual activity: Defer         Family History   Problem Relation Age of Onset    No  "Known Problems Mother     No Known Problems Father     Colon cancer Neg Hx     Colon polyps Neg Hx        Objective  Physical Exam:  /77 (BP Location: Left arm, Patient Position: Sitting)   Pulse 111   Temp 98 °F (36.7 °C) (Oral)   Resp 18   Ht 154.9 cm (61\")   Wt 63.5 kg (140 lb)   LMP 05/01/2025 (Approximate)   SpO2 98%   BMI 26.45 kg/m²      Physical Exam  Vitals and nursing note reviewed.   Constitutional:       General: She is not in acute distress.     Appearance: She is not toxic-appearing.   HENT:      Head: Normocephalic and atraumatic.      Right Ear: A middle ear effusion is present. Tympanic membrane is erythematous.      Left Ear: A middle ear effusion is present. Tympanic membrane is erythematous.      Nose: Congestion present.      Mouth/Throat:      Mouth: No oral lesions.      Pharynx: Uvula midline. No oropharyngeal exudate.      Tonsils: No tonsillar exudate.   Eyes:      Conjunctiva/sclera: Conjunctivae normal.   Cardiovascular:      Rate and Rhythm: Tachycardia present.      Heart sounds: Normal heart sounds.   Pulmonary:      Effort: Pulmonary effort is normal.      Breath sounds: Wheezing present.   Abdominal:      General: There is no distension.      Palpations: Abdomen is soft.   Musculoskeletal:      Cervical back: Normal range of motion and neck supple.   Skin:     General: Skin is warm and dry.   Neurological:      General: No focal deficit present.      Mental Status: She is alert and oriented to person, place, and time.   Psychiatric:         Mood and Affect: Mood normal.         Behavior: Behavior normal.         Procedures    ED Course:         Lab Results (last 24 hours)       Procedure Component Value Units Date/Time    Urine Drug Screen - Supervised - Urine, Clean Catch [[id number]]  (Abnormal) Collected: 06/26/25 1502    Specimen: Urine, Clean Catch Updated: 06/26/25 1612     THC, Screen, Urine Positive     Phencyclidine (PCP), Urine Negative     Cocaine Screen, " Urine Negative     Methamphetamine, Ur Negative     Opiate Screen Negative     Amphetamine Screen, Urine Negative     Benzodiazepine Screen, Urine Positive     Tricyclic Antidepressants Screen Negative     Methadone Screen, Urine Negative     Barbiturates Screen, Urine Negative     Oxycodone Screen, Urine Negative     Buprenorphine, Screen, Urine Positive    Narrative:      Cutoff For Drugs Screened:    Amphetamines               500 ng/ml  Barbiturates               200 ng/ml  Benzodiazepines            150 ng/ml  Cocaine                    150 ng/ml  Methadone                  200 ng/ml  Opiates                    100 ng/ml  Phencyclidine               25 ng/ml  THC                         50 ng/ml  Methamphetamine            500 ng/ml  Tricyclic Antidepressants  300 ng/ml  Oxycodone                  100 ng/ml  Buprenorphine               10 ng/ml    The normal value for all drugs tested is negative. This report includes unconfirmed screening results, with the cutoff values listed, to be used for medical treatment purposes only.  Unconfirmed results must not be used for non-medical purposes such as employment or legal testing.  Clinical consideration should be applied to any drug of abuse test, particularly when unconfirmed results are used.      Behavioral Health Full Screen and Definitive Testing (CAL) - Urine, Clean Catch [[id number]] Collected: 06/26/25 1502    Specimen: Urine, Clean Catch Updated: 06/26/25 154    Narrative:      The following orders were created for panel order Behavioral Health Full Screen and Definitive Testing (CAL) - Urine, Clean Catch.  Procedure                               Abnormality         Status                     ---------                               -----------         ------                     Full Screen Behavioral H...[[id number]]                      In process                 Definitive Testing for I...[[id number]]                      In process                    Please view results for these tests on the individual orders.    Full Screen Behavioral Health/Medical - Urine, Clean Catch [[id number]] Collected: 06/26/25 1502    Specimen: Urine, Clean Catch Updated: 06/26/25 1543    Definitive Testing for Inconsistent Results - MAT (no THC) - Urine, Clean Catch [[id number]] Collected: 06/26/25 1502    Specimen: Urine, Clean Catch Updated: 06/26/25 1543    COVID-19, FLU A/B, RSV PCR 1 HR TAT - Swab, Nasopharynx [[id number]]  (Normal) Collected: 06/26/25 1520    Specimen: Swab from Nasopharynx Updated: 06/26/25 1603     COVID19 Not Detected     Influenza A PCR Not Detected     Influenza B PCR Not Detected     RSV, PCR Not Detected    Narrative:      Fact sheet for providers: https://www.fda.gov/media/891160/download    Fact sheet for patients: https://www.fda.gov/media/085267/download    Test performed by PCR.    Rapid Strep A Screen - Swab, Throat [[id number]]  (Normal) Collected: 06/26/25 1520    Specimen: Swab from Throat Updated: 06/26/25 1533     Strep A Ag Negative    Beta Strep Culture, Throat - Swab, Throat [[id number]] Collected: 06/26/25 1520    Specimen: Swab from Throat Updated: 06/26/25 1533             No radiology results from the last 24 hrs       MDM     Amount and/or Complexity of Data Reviewed  Clinical lab tests: reviewed    Patient evaluated in the ER for flulike symptoms including sore throat, congestion, productive cough, body aches and generally not feeling well over the last week.  Patient recently released from incarceration.  She is tachycardic but otherwise hemodynamically stable, afebrile, nontoxic-appearing on exam.  Differential diagnosis includes but is not limited to viral illness such as COVID, flu, RSV, rhinovirus, strep pharyngitis, pneumonia, among others.  Initial plan includes strep swab, COVID/flu/RSV swab, chest x-ray, initial treatment with Tylenol, Motrin, Robitussin DM.    Swabs were negative.  Chest x-ray unremarkable per my  interpretation.  Patient does have bilateral erythematous TMs and middle ear effusions on exam.  Will treat for otitis media with Augmentin.  Prescription also sent for Flonase.  Recommended plenty of fluids and rest.  Recommended follow-up with primary care provider for further outpatient evaluation and to establish care.  Precautions were given for return to the ER for any new or worsening symptoms.    Final diagnoses:   Viral illness   Acute otitis media, unspecified otitis media type          Geetha Yin, PA-C  06/26/25 1939

## 2025-06-27 ENCOUNTER — TELEPHONE (OUTPATIENT)
Dept: GASTROENTEROLOGY | Facility: CLINIC | Age: 40
End: 2025-06-27
Payer: MEDICAID

## 2025-06-27 NOTE — TELEPHONE ENCOUNTER
Per Anesthesia, the patient must cancel procedure (colonoscopy) for 06/30/25 due to her being on antibiotics.  She will need to be rescheduled at least 2 weeks out.  LM for patient to call back about rescheduling.

## 2025-06-28 LAB
BACTERIA SPEC AEROBE CULT: NORMAL
REF LAB TEST METHOD: NORMAL

## 2025-06-30 ENCOUNTER — DOCUMENTATION (OUTPATIENT)
Dept: PSYCHIATRY | Facility: HOSPITAL | Age: 40
End: 2025-06-30
Payer: MEDICAID

## 2025-06-30 LAB
6-ACETYLMORPHINE: NOT DETECTED NG/ML
9-DELTA-THC-COOH: NOT DETECTED NG/ML
ALCOHOL, ETHYL: NOT DETECTED MG/DL
AMPHETAMINE: NOT DETECTED NG/ML
BENZODIAZ BLD QL: NOT DETECTED NG/ML
BUPRENORPHINE SAL CFM-MCNC: DETECTED NG/ML
BUPRENORPHINE: 394 NG/ML
COCAINE METABOLITE: NOT DETECTED NG/ML
CREATININE: 374.9 MG/DL
EDDP SERPL QL: NOT DETECTED NG/ML
FENTANYL-EIA: NOT DETECTED NG/ML
GABAPENTIN: NOT DETECTED NG/ML
METHAMPHETAMINE: NOT DETECTED NG/ML
METHYLPHENIDATE: NOT DETECTED NG/ML
NORBUPRENORPHINE: 732 NG/ML
O-DESMETHYLTRAMADOL: NOT DETECTED NG/ML
OPIATES: NOT DETECTED NG/ML
OXIDANTS: NOT DETECTED UG/ML
OXYCODONE: NOT DETECTED NG/ML
PCP: NOT DETECTED NG/ML
PH: 5.7 (ref 4.5–9)
PREGABALIN: NOT DETECTED NG/ML
SPECIFIC GRAVITY, QUAN: 1.03 (ref 1–1.03)
THC: DETECTED NG/ML
TRAMADOL UR CFM-MCNC: NOT DETECTED NG/ML
ZOLPIDEM PHENYL-4-CARBOXYLIC ACID: NOT DETECTED NG/ML

## 2025-06-30 NOTE — PROGRESS NOTES
Behavioral Health/CD-IOP Note:  Patient showed up to CD-IOP on 6/26/2025. Clinician and Steve ([facility] ) spoke with patient.  Patient had previously been discharged with a higher level of care recommended. Patient stated that she last used on the day that she went to custodial and was released from custodial on 6/25/2025. Patient denied suicidal or homicidal thoughts.     Clinician explained to patient that a higher level of care is recommended.     Anjana, , met with patient on 6/26/2025 in an attempt to get patient into residential treatment, however, clinician was informed that patient left office without committing to going.     At this time, due to current stressors including legal situation as well as patient's self-reported substance use since CD-IOP discharge, a higher level of care remains the recommendation.    -Andre Zuñiga LCSW.   6/30/2025.

## 2025-07-02 ENCOUNTER — SPECIALTY PHARMACY (OUTPATIENT)
Dept: PHARMACY | Facility: HOSPITAL | Age: 40
End: 2025-07-02
Payer: MEDICAID

## 2025-07-02 NOTE — PROGRESS NOTES
Specialty Pharmacy Patient Management Program  Refill Outreach     Ema was contacted today regarding refills of their medication(s).    Refill Questions      Flowsheet Row Most Recent Value   Changes to allergies? No   Changes to medications? No   New conditions or infections since last clinic visit No   Unplanned office visit, urgent care, ED, or hospital admission in the last 4 weeks  Yes  [ED on 06/10 - not related to Hep C]   Financial problems or insurance changes  No   Since the previous refill, were any specialty medication doses or scheduled injections missed or delayed?  Yes   If yes, please provide the amount 9 days   Why were doses missed? pt was incarcerated   Does this patient require a clinical escalation to a pharmacist? No            Delivery Questions      Flowsheet Row Most Recent Value   Delivery method  at Pharmacy   Doses left of specialty medications 0 refills - this fill will complete her treatment   Copay verified? Yes   Copay amount $0   Copay form of payment No copayment ($0)   Signature Required Yes   Do you consent to receive electronic handouts?  No                 Follow-up:  No follow up needed, this fill will complete pt's treatment.     Arsh Maldonado MA  7/2/2025  09:29 EDT

## 2025-07-09 ENCOUNTER — DOCUMENTATION (OUTPATIENT)
Dept: PSYCHIATRY | Facility: HOSPITAL | Age: 40
End: 2025-07-09
Payer: MEDICAID

## 2025-07-09 NOTE — PROGRESS NOTES
Behavioral Health Note:  Per Anjana, , she has reached out to patient with no response.     -Andre Zuñiga, DENNIS.   7/9/2025.

## 2025-07-11 ENCOUNTER — SPECIALTY PHARMACY (OUTPATIENT)
Dept: PHARMACY | Facility: HOSPITAL | Age: 40
End: 2025-07-11
Payer: MEDICAID

## 2025-07-11 DIAGNOSIS — B18.2 CHRONIC HEPATITIS C WITHOUT HEPATIC COMA: Primary | ICD-10-CM

## 2025-07-11 RX ORDER — MIRTAZAPINE 15 MG/1
15 TABLET, FILM COATED ORAL NIGHTLY PRN
COMMUNITY
Start: 2025-06-17

## 2025-07-11 RX ORDER — PRAZOSIN HYDROCHLORIDE 2 MG/1
2 CAPSULE ORAL NIGHTLY
COMMUNITY
Start: 2025-06-15

## 2025-07-11 RX ORDER — BENZTROPINE MESYLATE 2 MG/1
2 TABLET ORAL NIGHTLY
COMMUNITY
Start: 2025-06-11

## 2025-07-11 NOTE — PROGRESS NOTES
[facility]  Specialty Pharmacy Patient Management Program  Hepatitis C Reassessment     Ema Phan was [facility] by their provider [facility] for [facility] treatment program. A follow-up outreach was conducted, including assessment of continued therapy appropriateness, medication adherence, and side effect incidence and management for Mavyret therapy.     Ms. Phan reports missing ~7-9 doses of Mavyret therapy due to being incarcerated. She currently has one dose of her first 4-weeks left and will  Mavyret refill from [facility] today. She also plans to repeat her HCV labs at that time. She stated that she has noticed slight nausea with medication but it is tolerable. Counseled her on the importance of compliance to medication to ensure cure of hepatitis C infection. Follow-up in [facility] C Clinic scheduled at the [facility] on 8/8.    Changes to Insurance Coverage or Financial Support  N/A    Relevant Past Medical History and Comorbidities  Relevant medical history and concomitant health conditions were discussed with the patient. The patient's chart has been reviewed for relevant past medical history and comorbid health conditions and updated as necessary.   Past Medical History:   Diagnosis Date    Addiction     Anxiety     Depression     Hepatitis C     Migraine     Panic attacks      Social History     Socioeconomic History    Marital status:    Tobacco Use    Smoking status: Former     Current packs/day: 0.00     Types: Cigarettes     Quit date: 9/1/2021     Years since quitting: 3.8     Passive exposure: Past    Smokeless tobacco: Never   Vaping Use    Vaping status: Every Day    Substances: Nicotine, Flavoring    Devices: Disposable    Passive vaping exposure: Yes   Substance and Sexual Activity    Alcohol use: No    Drug use: Not Currently     Types: Methamphetamines, Heroin, Marijuana     Comment: sober for two years    Sexual activity:  "Defer       Problem list reviewed by Gia Johns RPH on 7/11/2025 at  1:06 PM      Hospitalizations and Urgent Care Since Last Assessment  ED Visits, Admissions, or Hospitalizations: N/A  Urgent Office Visits: N/A    Allergies  Known allergies and reactions were discussed with the patient. The patient's chart has been reviewed for allergy information and updated as necessary.   No Known Allergies    Allergies reviewed by Gia Johns RPH on 7/11/2025 at  1:06 PM      Relevant Laboratory Values  Relevant laboratory values were discussed with the patient. The following specialty medication dose adjustment(s) are recommended: N/A    HCV RNA Quantitative:   Patient plans to complete 4-Wk labs today, 7/11    Lab Results   Component Value Date    GLUCOSE 79 05/02/2025    CALCIUM 9.0 05/02/2025     05/02/2025    K 3.8 05/02/2025    CO2 20.9 (L) 05/02/2025     05/02/2025    BUN 6 05/02/2025    CREATININE 0.69 05/02/2025    EGFRIFNONA 84 03/04/2020    BCR 8.7 05/02/2025    ANIONGAP 10.1 05/02/2025     No results found for: \"CHOL\", \"CHLPL\", \"TRIG\", \"HDL\", \"LDL\", \"LDLDIRECT\"    Current Medication List  This medication list has been reviewed with the patient and evaluated for any interactions or necessary modifications/recommendations, and updated to include all prescription medications, OTC medications, and supplements the patient is currently taking.  This list reflects what is contained in the patient's profile, which has also been marked as reviewed to communicate to other providers it is the most up to date version of the patient's current medication therapy.     Current Outpatient Medications:     benztropine (COGENTIN) 2 MG tablet, Take 1 tablet by mouth Every Night., Disp: , Rfl:     mirtazapine (REMERON) 15 MG tablet, Take 1 tablet by mouth At Night As Needed. for sleep, Disp: , Rfl:     prazosin (MINIPRESS) 2 MG capsule, Take 1 capsule by mouth Every Night., Disp: , Rfl:     ALPRAZolam " (XANAX) 0.5 MG tablet, Take 1 tablet by mouth 3 (Three) Times a Day As Needed for Anxiety., Disp: , Rfl:     bisacodyl (Dulcolax) 5 MG EC tablet, Follow instructions given at office, Disp: 4 tablet, Rfl: 0    buprenorphine-naloxone (SUBOXONE) 8-2 MG film film, Place 2 films under the tongue Daily., Disp: , Rfl:     busPIRone (BUSPAR) 15 MG tablet, Take 1 tablet by mouth 3 (Three) Times a Day., Disp: , Rfl:     FLUoxetine (PROzac) 20 MG capsule, Take 1 capsule by mouth Daily., Disp: , Rfl:     fluticasone (FLONASE) 50 MCG/ACT nasal spray, Administer 2 sprays into the nostril(s) as directed by provider Daily., Disp: 15.8 g, Rfl: 0    Glecaprevir-Pibrentasvir (Mavyret) 100-40 MG tablet, Take 3 tablets by mouth Daily., Disp: 84 tablet, Rfl: 1    Hydrocortisone, Perianal, (Anusol-HC) 2.5 % rectal cream, Insert  into the rectum 2 (Two) Times a Day. (Patient taking differently: Insert  into the rectum 2 (Two) Times a Day As Needed.), Disp: 28 g, Rfl: 1    hydrOXYzine pamoate (VISTARIL) 25 MG capsule, Take 1 capsule by mouth 3 (Three) Times a Day As Needed for Anxiety., Disp: , Rfl:     polyethylene glycol (MIRALAX) 17 GM/SCOOP powder, Take 17 g by mouth Daily. Mix with 8 oz liquid, Disp: 510 g, Rfl: 5    promethazine (PHENERGAN) 25 MG tablet, Take 1 tablet by mouth Every 8 (Eight) Hours As Needed for Vomiting or Nausea., Disp: , Rfl:     tiZANidine (ZANAFLEX) 4 MG tablet, Take 1 tablet by mouth Every Night., Disp: , Rfl:   No current facility-administered medications for this visit.    Medicines reviewed by Gia Johns RP on 7/11/2025 at  1:06 PM    Drug Interactions  N/A    Adverse Drug Reactions  Medication tolerability: Tolerating with no to minimal ADRs  Medication plan: Continue therapy with normal follow-up    Plan for ADR Management: Patient has experienced slight nausea with medication. Counseled her on ensuring that she is taking medication with food.    Adherence, Self-Administration, and Current Therapy  Problems  Adherence related to the patient's specialty therapy was discussed with the patient. The Adherence segment of this outreach has been reviewed and updated.     Adherence Questions  Linked Medication(s) Assessed: Glecaprevir-Pibrentasvir (Mavyret)  On average, how many doses/injections does the patient miss per month?: 7  What are the identified reasons for non-adherence or missed doses? : other  List other reason(s) for non-adherence or missed doses: Patient was incarcerated  What is the estimated medication adherence level?: 80-89%  Based on the patient/caregiver response and refill history, does this patient require an MTP to track adherence improvements?: no    Additional Barriers to Patient Self-Administration: N/A  Methods for Supporting Patient Self-Administration: N/A    Open Medication Therapy Problems  No medication therapy recommendations to display    Goals of Therapy  Goals related to the patient's specialty therapy were discussed with the patient. The Patient Goals segment of this outreach has been reviewed and updated.   Goals Addressed Today        Specialty Pharmacy General Goal      HCV RNA not detected 12-weeks after treatment completion (SVR12)  7/11/25: Patient reports missing 7-10 doses of medication while she was incarcerated. She has one more dose of the first 4-weeks left and will  refill from [facility] on 7/11 as well as repeat HCV labs.               Quality of Life Assessment   Quality of Life related to the patient's enrollment in the patient management program and services provided was discussed with the patient. The QOL segment of this outreach has been reviewed and updated.  Quality of Life Improvement Scale: 10-Significantly better    Reassessment Plan & Follow-Up  1. Medication Therapy Changes: N/A  2. Related Plans, Therapy Recommendations, or Issues to Be Addressed: Patient will complete 4-wk HCV labs today and pick-up refill of Mavyret at that time.  She will follow-up in [facility] on 8/8/25.   3. Pharmacist to repeat assessment in 4-weeks after end of treatment labs performed.  4. Care Coordinator to set up future refill outreaches, coordinate prescription delivery, and escalate clinical questions to pharmacist.    Attestation  Therapeutic appropriateness: Appropriate   I attest the patient was actively involved in and has agreed to the above plan of care.  If the prescribed therapy is at any point deemed not appropriate based on the current or future assessments, a consultation will be initiated with the patient's specialty care provider to determine the best course of action. The revised plan of therapy will be documented along with any required assessments and/or additional patient education provided.     Gia Johns RP  7/11/2025  13:10 EDT